# Patient Record
Sex: FEMALE | Race: WHITE | NOT HISPANIC OR LATINO
[De-identification: names, ages, dates, MRNs, and addresses within clinical notes are randomized per-mention and may not be internally consistent; named-entity substitution may affect disease eponyms.]

---

## 2017-02-07 ENCOUNTER — RX RENEWAL (OUTPATIENT)
Age: 57
End: 2017-02-07

## 2017-02-09 ENCOUNTER — OTHER (OUTPATIENT)
Age: 57
End: 2017-02-09

## 2017-02-13 ENCOUNTER — APPOINTMENT (OUTPATIENT)
Dept: INTERNAL MEDICINE | Facility: CLINIC | Age: 57
End: 2017-02-13

## 2017-04-10 ENCOUNTER — APPOINTMENT (OUTPATIENT)
Dept: INTERNAL MEDICINE | Facility: CLINIC | Age: 57
End: 2017-04-10

## 2017-06-22 ENCOUNTER — APPOINTMENT (OUTPATIENT)
Dept: INTERNAL MEDICINE | Facility: CLINIC | Age: 57
End: 2017-06-22

## 2017-06-22 VITALS
OXYGEN SATURATION: 98 % | TEMPERATURE: 98.2 F | SYSTOLIC BLOOD PRESSURE: 150 MMHG | WEIGHT: 169 LBS | BODY MASS INDEX: 24.96 KG/M2 | HEART RATE: 83 BPM | DIASTOLIC BLOOD PRESSURE: 70 MMHG

## 2017-06-22 DIAGNOSIS — E78.5 HYPERLIPIDEMIA, UNSPECIFIED: ICD-10-CM

## 2017-06-22 DIAGNOSIS — L97.511 NON-PRESSURE CHRONIC ULCER OF OTHER PART OF RIGHT FOOT LIMITED TO BREAKDOWN OF SKIN: ICD-10-CM

## 2017-06-22 DIAGNOSIS — M54.9 DORSALGIA, UNSPECIFIED: ICD-10-CM

## 2017-06-22 DIAGNOSIS — S82.209A UNSPECIFIED FRACTURE OF SHAFT OF UNSPECIFIED TIBIA, INITIAL ENCOUNTER FOR CLOSED FRACTURE: ICD-10-CM

## 2017-06-22 DIAGNOSIS — L30.4 ERYTHEMA INTERTRIGO: ICD-10-CM

## 2017-06-23 ENCOUNTER — OTHER (OUTPATIENT)
Age: 57
End: 2017-06-23

## 2017-06-23 LAB
ANION GAP SERPL CALC-SCNC: 19 MMOL/L
BUN SERPL-MCNC: 8 MG/DL
CALCIUM SERPL-MCNC: 9.8 MG/DL
CHLORIDE SERPL-SCNC: 98 MMOL/L
CHOLEST SERPL-MCNC: 201 MG/DL
CHOLEST/HDLC SERPL: 3.9 RATIO
CO2 SERPL-SCNC: 23 MMOL/L
CREAT SERPL-MCNC: 0.72 MG/DL
CREAT SPEC-SCNC: 21 MG/DL
GLUCOSE SERPL-MCNC: 97 MG/DL
HBA1C MFR BLD HPLC: 6.4 %
HDLC SERPL-MCNC: 51 MG/DL
LDLC SERPL CALC-MCNC: 112 MG/DL
MICROALBUMIN 24H UR DL<=1MG/L-MCNC: 0.3 MG/DL
MICROALBUMIN/CREAT 24H UR-RTO: 14 MG/G
POTASSIUM SERPL-SCNC: 4.3 MMOL/L
SODIUM SERPL-SCNC: 140 MMOL/L
TRIGL SERPL-MCNC: 191 MG/DL

## 2017-07-07 ENCOUNTER — RESULT REVIEW (OUTPATIENT)
Age: 57
End: 2017-07-07

## 2017-07-11 ENCOUNTER — RESULT REVIEW (OUTPATIENT)
Age: 57
End: 2017-07-11

## 2017-07-25 ENCOUNTER — APPOINTMENT (OUTPATIENT)
Dept: INTERNAL MEDICINE | Facility: CLINIC | Age: 57
End: 2017-07-25
Payer: MEDICAID

## 2017-07-25 VITALS
DIASTOLIC BLOOD PRESSURE: 76 MMHG | OXYGEN SATURATION: 98 % | TEMPERATURE: 97.9 F | SYSTOLIC BLOOD PRESSURE: 120 MMHG | WEIGHT: 172 LBS | HEIGHT: 69 IN | HEART RATE: 67 BPM | BODY MASS INDEX: 25.48 KG/M2

## 2017-07-25 PROCEDURE — 99213 OFFICE O/P EST LOW 20 MIN: CPT | Mod: 25,GE

## 2017-07-25 PROCEDURE — 36415 COLL VENOUS BLD VENIPUNCTURE: CPT

## 2017-07-26 LAB
24R-OH-CALCIDIOL SERPL-MCNC: 70.9 PG/ML
25(OH)D3 SERPL-MCNC: 23.8 NG/ML

## 2017-08-25 ENCOUNTER — RX RENEWAL (OUTPATIENT)
Age: 57
End: 2017-08-25

## 2017-08-28 ENCOUNTER — RX RENEWAL (OUTPATIENT)
Age: 57
End: 2017-08-28

## 2017-10-17 ENCOUNTER — APPOINTMENT (OUTPATIENT)
Dept: VASCULAR SURGERY | Facility: CLINIC | Age: 57
End: 2017-10-17

## 2017-11-03 ENCOUNTER — APPOINTMENT (OUTPATIENT)
Dept: INTERNAL MEDICINE | Facility: CLINIC | Age: 57
End: 2017-11-03
Payer: COMMERCIAL

## 2017-11-03 VITALS
BODY MASS INDEX: 24.22 KG/M2 | OXYGEN SATURATION: 98 % | DIASTOLIC BLOOD PRESSURE: 58 MMHG | HEART RATE: 62 BPM | TEMPERATURE: 97.9 F | WEIGHT: 164 LBS | SYSTOLIC BLOOD PRESSURE: 110 MMHG

## 2017-11-03 DIAGNOSIS — J30.2 OTHER SEASONAL ALLERGIC RHINITIS: ICD-10-CM

## 2017-11-03 DIAGNOSIS — Z23 ENCOUNTER FOR IMMUNIZATION: ICD-10-CM

## 2017-11-03 PROCEDURE — G0008: CPT

## 2017-11-03 PROCEDURE — 90686 IIV4 VACC NO PRSV 0.5 ML IM: CPT

## 2017-11-03 PROCEDURE — 99213 OFFICE O/P EST LOW 20 MIN: CPT | Mod: 25,GE

## 2017-11-03 PROCEDURE — 36415 COLL VENOUS BLD VENIPUNCTURE: CPT

## 2017-11-03 RX ORDER — LANOLIN ALCOHOL/MO/W.PET/CERES
CREAM (GRAM) TOPICAL
Qty: 1 | Refills: 0 | Status: DISCONTINUED | COMMUNITY
Start: 2017-06-22 | End: 2017-11-03

## 2017-11-03 RX ORDER — ERGOCALCIFEROL 1.25 MG/1
1.25 MG CAPSULE, LIQUID FILLED ORAL
Qty: 30 | Refills: 2 | Status: DISCONTINUED | COMMUNITY
Start: 2017-06-22 | End: 2017-11-03

## 2017-11-03 RX ORDER — NYSTATIN 100000 [USP'U]/G
100000 CREAM TOPICAL TWICE DAILY
Qty: 1 | Refills: 0 | Status: DISCONTINUED | COMMUNITY
Start: 2017-06-22 | End: 2017-11-03

## 2017-11-03 RX ORDER — OSTOMY SUPPLY 2 3/4"
EACH MISCELLANEOUS
Qty: 1 | Refills: 0 | Status: DISCONTINUED | COMMUNITY
Start: 2017-06-22 | End: 2017-11-03

## 2017-11-03 RX ORDER — METHOCARBAMOL 500 MG/1
500 TABLET, FILM COATED ORAL EVERY 6 HOURS
Refills: 0 | Status: DISCONTINUED | COMMUNITY
Start: 2017-06-22 | End: 2017-11-03

## 2017-11-07 LAB
25(OH)D3 SERPL-MCNC: 32.3 NG/ML
HBA1C MFR BLD HPLC: 6.2 %

## 2017-11-24 ENCOUNTER — APPOINTMENT (OUTPATIENT)
Dept: INTERNAL MEDICINE | Facility: CLINIC | Age: 57
End: 2017-11-24

## 2017-11-27 ENCOUNTER — OTHER (OUTPATIENT)
Age: 57
End: 2017-11-27

## 2017-12-01 ENCOUNTER — APPOINTMENT (OUTPATIENT)
Dept: INTERNAL MEDICINE | Facility: CLINIC | Age: 57
End: 2017-12-01
Payer: COMMERCIAL

## 2017-12-01 ENCOUNTER — APPOINTMENT (OUTPATIENT)
Dept: INTERNAL MEDICINE | Facility: CLINIC | Age: 57
End: 2017-12-01

## 2017-12-01 VITALS
DIASTOLIC BLOOD PRESSURE: 75 MMHG | SYSTOLIC BLOOD PRESSURE: 122 MMHG | WEIGHT: 166 LBS | TEMPERATURE: 98.1 F | BODY MASS INDEX: 24.51 KG/M2 | HEART RATE: 78 BPM | OXYGEN SATURATION: 97 %

## 2017-12-01 DIAGNOSIS — Z12.4 ENCOUNTER FOR SCREENING FOR MALIGNANT NEOPLASM OF CERVIX: ICD-10-CM

## 2017-12-01 PROCEDURE — 99214 OFFICE O/P EST MOD 30 MIN: CPT | Mod: GC

## 2017-12-04 ENCOUNTER — OTHER (OUTPATIENT)
Age: 57
End: 2017-12-04

## 2017-12-15 LAB
CYTOLOGY CVX/VAG DOC THIN PREP: NORMAL
HPV HIGH+LOW RISK DNA PNL CVX: NOT DETECTED

## 2017-12-20 ENCOUNTER — FORM ENCOUNTER (OUTPATIENT)
Age: 57
End: 2017-12-20

## 2017-12-21 ENCOUNTER — OUTPATIENT (OUTPATIENT)
Dept: OUTPATIENT SERVICES | Facility: HOSPITAL | Age: 57
LOS: 1 days | End: 2017-12-21
Payer: COMMERCIAL

## 2017-12-21 PROCEDURE — 77067 SCR MAMMO BI INCL CAD: CPT

## 2017-12-21 PROCEDURE — G0202: CPT | Mod: 26

## 2018-04-02 ENCOUNTER — APPOINTMENT (OUTPATIENT)
Dept: INTERNAL MEDICINE | Facility: CLINIC | Age: 58
End: 2018-04-02

## 2018-04-03 ENCOUNTER — APPOINTMENT (OUTPATIENT)
Dept: VASCULAR SURGERY | Facility: CLINIC | Age: 58
End: 2018-04-03

## 2018-05-29 ENCOUNTER — RX RENEWAL (OUTPATIENT)
Age: 58
End: 2018-05-29

## 2018-06-17 ENCOUNTER — RESULT CHARGE (OUTPATIENT)
Age: 58
End: 2018-06-17

## 2018-06-18 ENCOUNTER — APPOINTMENT (OUTPATIENT)
Dept: INTERNAL MEDICINE | Facility: CLINIC | Age: 58
End: 2018-06-18
Payer: COMMERCIAL

## 2018-06-18 VITALS
WEIGHT: 176 LBS | DIASTOLIC BLOOD PRESSURE: 58 MMHG | TEMPERATURE: 98.2 F | OXYGEN SATURATION: 98 % | HEIGHT: 69 IN | BODY MASS INDEX: 26.07 KG/M2 | SYSTOLIC BLOOD PRESSURE: 113 MMHG | HEART RATE: 68 BPM

## 2018-06-18 DIAGNOSIS — Z13.820 ENCOUNTER FOR SCREENING FOR OSTEOPOROSIS: ICD-10-CM

## 2018-06-18 DIAGNOSIS — Z11.3 ENCOUNTER FOR SCREENING FOR INFECTIONS WITH A PREDOMINANTLY SEXUAL MODE OF TRANSMISSION: ICD-10-CM

## 2018-06-18 DIAGNOSIS — E55.9 VITAMIN D DEFICIENCY, UNSPECIFIED: ICD-10-CM

## 2018-06-18 DIAGNOSIS — S82.209A UNSPECIFIED FRACTURE OF SHAFT OF UNSPECIFIED TIBIA, INITIAL ENCOUNTER FOR CLOSED FRACTURE: ICD-10-CM

## 2018-06-18 PROCEDURE — 93000 ELECTROCARDIOGRAM COMPLETE: CPT

## 2018-06-18 PROCEDURE — 90715 TDAP VACCINE 7 YRS/> IM: CPT

## 2018-06-18 PROCEDURE — 99396 PREV VISIT EST AGE 40-64: CPT | Mod: 25,GE

## 2018-06-18 PROCEDURE — 36415 COLL VENOUS BLD VENIPUNCTURE: CPT

## 2018-06-18 PROCEDURE — 90471 IMMUNIZATION ADMIN: CPT

## 2018-06-18 RX ORDER — CROMOLYN SODIUM 40 MG/ML
4 SOLUTION/ DROPS OPHTHALMIC 4 TIMES DAILY
Refills: 0 | Status: DISCONTINUED | COMMUNITY
Start: 2017-11-03 | End: 2018-06-18

## 2018-06-19 LAB
ANION GAP SERPL CALC-SCNC: 17 MMOL/L
BUN SERPL-MCNC: 12 MG/DL
CALCIUM SERPL-MCNC: 10 MG/DL
CHLORIDE SERPL-SCNC: 103 MMOL/L
CHOLEST SERPL-MCNC: 169 MG/DL
CHOLEST/HDLC SERPL: 4.3 RATIO
CO2 SERPL-SCNC: 23 MMOL/L
CREAT SERPL-MCNC: 0.88 MG/DL
CREAT SPEC-SCNC: 12 MG/DL
GLUCOSE SERPL-MCNC: 84 MG/DL
HBA1C MFR BLD HPLC: 6.6 %
HDLC SERPL-MCNC: 39 MG/DL
LDLC SERPL CALC-MCNC: 89 MG/DL
MICROALBUMIN 24H UR DL<=1MG/L-MCNC: 0.7 MG/DL
MICROALBUMIN/CREAT 24H UR-RTO: 58 MG/G
POTASSIUM SERPL-SCNC: 4.6 MMOL/L
SODIUM SERPL-SCNC: 143 MMOL/L
TRIGL SERPL-MCNC: 204 MG/DL

## 2018-06-25 ENCOUNTER — APPOINTMENT (OUTPATIENT)
Dept: INTERNAL MEDICINE | Facility: CLINIC | Age: 58
End: 2018-06-25

## 2018-07-03 ENCOUNTER — APPOINTMENT (OUTPATIENT)
Dept: CT IMAGING | Facility: HOSPITAL | Age: 58
End: 2018-07-03

## 2018-07-03 ENCOUNTER — APPOINTMENT (OUTPATIENT)
Dept: PULMONOLOGY | Facility: CLINIC | Age: 58
End: 2018-07-03

## 2018-07-27 PROBLEM — J30.2 SEASONAL ALLERGIES: Status: ACTIVE | Noted: 2017-11-03

## 2018-08-03 ENCOUNTER — RX RENEWAL (OUTPATIENT)
Age: 58
End: 2018-08-03

## 2018-09-12 ENCOUNTER — FORM ENCOUNTER (OUTPATIENT)
Age: 58
End: 2018-09-12

## 2018-09-13 ENCOUNTER — APPOINTMENT (OUTPATIENT)
Dept: RADIOLOGY | Facility: HOSPITAL | Age: 58
End: 2018-09-13
Payer: COMMERCIAL

## 2018-09-13 ENCOUNTER — OUTPATIENT (OUTPATIENT)
Dept: OUTPATIENT SERVICES | Facility: HOSPITAL | Age: 58
LOS: 1 days | End: 2018-09-13
Payer: COMMERCIAL

## 2018-09-13 PROCEDURE — 77080 DXA BONE DENSITY AXIAL: CPT | Mod: 26

## 2018-09-13 PROCEDURE — 77080 DXA BONE DENSITY AXIAL: CPT

## 2018-09-18 ENCOUNTER — MEDICATION RENEWAL (OUTPATIENT)
Age: 58
End: 2018-09-18

## 2018-09-18 ENCOUNTER — APPOINTMENT (OUTPATIENT)
Dept: INTERNAL MEDICINE | Facility: CLINIC | Age: 58
End: 2018-09-18
Payer: COMMERCIAL

## 2018-09-18 VITALS
SYSTOLIC BLOOD PRESSURE: 120 MMHG | HEART RATE: 78 BPM | TEMPERATURE: 98.4 F | HEIGHT: 69 IN | DIASTOLIC BLOOD PRESSURE: 76 MMHG | BODY MASS INDEX: 27.11 KG/M2 | OXYGEN SATURATION: 93 % | WEIGHT: 183 LBS

## 2018-09-18 DIAGNOSIS — M85.852 OTHER SPECIFIED DISORDERS OF BONE DENSITY AND STRUCTURE, LEFT THIGH: ICD-10-CM

## 2018-09-18 DIAGNOSIS — Z00.00 ENCOUNTER FOR GENERAL ADULT MEDICAL EXAMINATION W/OUT ABNORMAL FINDINGS: ICD-10-CM

## 2018-09-18 DIAGNOSIS — Z12.2 ENCOUNTER FOR SCREENING FOR MALIGNANT NEOPLASM OF RESPIRATORY ORGANS: ICD-10-CM

## 2018-09-18 PROBLEM — Z11.3 SCREENING FOR STD (SEXUALLY TRANSMITTED DISEASE): Status: ACTIVE | Noted: 2018-09-18

## 2018-09-18 PROCEDURE — 36415 COLL VENOUS BLD VENIPUNCTURE: CPT

## 2018-09-18 PROCEDURE — 99214 OFFICE O/P EST MOD 30 MIN: CPT | Mod: 25,GC

## 2018-09-19 LAB
ANION GAP SERPL CALC-SCNC: 14 MMOL/L
BUN SERPL-MCNC: 15 MG/DL
CALCIUM SERPL-MCNC: 10.1 MG/DL
CHLORIDE SERPL-SCNC: 105 MMOL/L
CO2 SERPL-SCNC: 26 MMOL/L
CREAT SERPL-MCNC: 1.01 MG/DL
GLUCOSE SERPL-MCNC: 106 MG/DL
HBA1C MFR BLD HPLC: 6.5 %
POTASSIUM SERPL-SCNC: 5.2 MMOL/L
SODIUM SERPL-SCNC: 145 MMOL/L

## 2018-10-10 ENCOUNTER — MED ADMIN CHARGE (OUTPATIENT)
Age: 58
End: 2018-10-10

## 2018-10-10 ENCOUNTER — APPOINTMENT (OUTPATIENT)
Dept: INTERNAL MEDICINE | Facility: CLINIC | Age: 58
End: 2018-10-10
Payer: COMMERCIAL

## 2018-10-10 ENCOUNTER — RESULT CHARGE (OUTPATIENT)
Age: 58
End: 2018-10-10

## 2018-10-10 VITALS
DIASTOLIC BLOOD PRESSURE: 78 MMHG | HEART RATE: 83 BPM | SYSTOLIC BLOOD PRESSURE: 134 MMHG | HEIGHT: 69 IN | RESPIRATION RATE: 16 BRPM | OXYGEN SATURATION: 98 % | BODY MASS INDEX: 26.66 KG/M2 | WEIGHT: 180 LBS | TEMPERATURE: 98 F

## 2018-10-10 DIAGNOSIS — Z23 ENCOUNTER FOR IMMUNIZATION: ICD-10-CM

## 2018-10-10 DIAGNOSIS — Z87.09 PERSONAL HISTORY OF OTHER DISEASES OF THE RESPIRATORY SYSTEM: ICD-10-CM

## 2018-10-10 DIAGNOSIS — J35.8 OTHER CHRONIC DISEASES OF TONSILS AND ADENOIDS: ICD-10-CM

## 2018-10-10 LAB — S PYO AG SPEC QL IA: NEGATIVE

## 2018-10-10 PROCEDURE — 87880 STREP A ASSAY W/OPTIC: CPT | Mod: QW

## 2018-10-10 PROCEDURE — 90686 IIV4 VACC NO PRSV 0.5 ML IM: CPT

## 2018-10-10 PROCEDURE — G0008: CPT

## 2018-10-10 PROCEDURE — 99213 OFFICE O/P EST LOW 20 MIN: CPT | Mod: GC,25

## 2018-10-10 RX ORDER — GUAIFENESIN AND PSEUDOEPHEDRINE HYDROCHLORIDE 600; 60 MG/1; MG/1
60-600 TABLET, EXTENDED RELEASE ORAL
Qty: 1 | Refills: 0 | Status: ACTIVE | COMMUNITY
Start: 2018-10-10 | End: 1900-01-01

## 2018-10-22 ENCOUNTER — FORM ENCOUNTER (OUTPATIENT)
Age: 58
End: 2018-10-22

## 2018-10-23 ENCOUNTER — OUTPATIENT (OUTPATIENT)
Dept: OUTPATIENT SERVICES | Facility: HOSPITAL | Age: 58
LOS: 1 days | End: 2018-10-23
Payer: COMMERCIAL

## 2018-10-23 ENCOUNTER — APPOINTMENT (OUTPATIENT)
Dept: PULMONOLOGY | Facility: CLINIC | Age: 58
End: 2018-10-23
Payer: COMMERCIAL

## 2018-10-23 ENCOUNTER — APPOINTMENT (OUTPATIENT)
Dept: CT IMAGING | Facility: HOSPITAL | Age: 58
End: 2018-10-23
Payer: COMMERCIAL

## 2018-10-23 VITALS
DIASTOLIC BLOOD PRESSURE: 80 MMHG | HEART RATE: 92 BPM | BODY MASS INDEX: 26.66 KG/M2 | WEIGHT: 180 LBS | SYSTOLIC BLOOD PRESSURE: 140 MMHG | TEMPERATURE: 98.3 F | OXYGEN SATURATION: 97 % | RESPIRATION RATE: 12 BRPM | HEIGHT: 69 IN

## 2018-10-23 DIAGNOSIS — F17.210 NICOTINE DEPENDENCE, CIGARETTES, UNCOMPLICATED: ICD-10-CM

## 2018-10-23 PROCEDURE — G0297: CPT

## 2018-10-23 PROCEDURE — 99406 BEHAV CHNG SMOKING 3-10 MIN: CPT

## 2018-10-23 PROCEDURE — G0296 VISIT TO DETERM LDCT ELIG: CPT

## 2018-10-23 PROCEDURE — G0297: CPT | Mod: 26

## 2018-10-29 ENCOUNTER — RESULT REVIEW (OUTPATIENT)
Age: 58
End: 2018-10-29

## 2018-11-08 ENCOUNTER — OTHER (OUTPATIENT)
Age: 58
End: 2018-11-08

## 2018-11-13 ENCOUNTER — OTHER (OUTPATIENT)
Age: 58
End: 2018-11-13

## 2018-11-26 ENCOUNTER — APPOINTMENT (OUTPATIENT)
Dept: INTERNAL MEDICINE | Facility: CLINIC | Age: 58
End: 2018-11-26

## 2018-11-27 ENCOUNTER — APPOINTMENT (OUTPATIENT)
Dept: VASCULAR SURGERY | Facility: CLINIC | Age: 58
End: 2018-11-27
Payer: COMMERCIAL

## 2018-11-27 PROCEDURE — 93978 VASCULAR STUDY: CPT

## 2018-11-27 PROCEDURE — 99213 OFFICE O/P EST LOW 20 MIN: CPT | Mod: 25

## 2018-12-10 ENCOUNTER — APPOINTMENT (OUTPATIENT)
Dept: INTERNAL MEDICINE | Facility: CLINIC | Age: 58
End: 2018-12-10
Payer: COMMERCIAL

## 2018-12-10 VITALS
HEIGHT: 69 IN | HEART RATE: 93 BPM | OXYGEN SATURATION: 98 % | WEIGHT: 179 LBS | TEMPERATURE: 98.3 F | DIASTOLIC BLOOD PRESSURE: 84 MMHG | BODY MASS INDEX: 26.51 KG/M2 | SYSTOLIC BLOOD PRESSURE: 170 MMHG

## 2018-12-10 VITALS — SYSTOLIC BLOOD PRESSURE: 140 MMHG | DIASTOLIC BLOOD PRESSURE: 70 MMHG

## 2018-12-10 DIAGNOSIS — Z23 ENCOUNTER FOR IMMUNIZATION: ICD-10-CM

## 2018-12-10 PROCEDURE — 36415 COLL VENOUS BLD VENIPUNCTURE: CPT

## 2018-12-10 PROCEDURE — 99214 OFFICE O/P EST MOD 30 MIN: CPT | Mod: GC,25

## 2018-12-10 RX ORDER — ZOSTER VACCINE RECOMBINANT, ADJUVANTED 50 MCG/0.5
50 KIT INTRAMUSCULAR
Qty: 1 | Refills: 0 | Status: ACTIVE | COMMUNITY
Start: 2018-12-10 | End: 1900-01-01

## 2018-12-11 LAB
ANION GAP SERPL CALC-SCNC: 16 MMOL/L
BUN SERPL-MCNC: 6 MG/DL
CALCIUM SERPL-MCNC: 10 MG/DL
CALCIUM SERPL-MCNC: 10.2 MG/DL
CHLORIDE SERPL-SCNC: 104 MMOL/L
CO2 SERPL-SCNC: 22 MMOL/L
CREAT SERPL-MCNC: 0.86 MG/DL
GLUCOSE SERPL-MCNC: 98 MG/DL
HBA1C MFR BLD HPLC: 6.2 %
PARATHYROID HORMONE INTACT: 99 PG/ML
POTASSIUM SERPL-SCNC: 4.5 MMOL/L
SODIUM SERPL-SCNC: 142 MMOL/L
TSH SERPL-ACNC: 3.62 UIU/ML

## 2019-01-09 ENCOUNTER — RX RENEWAL (OUTPATIENT)
Age: 59
End: 2019-01-09

## 2019-06-27 ENCOUNTER — APPOINTMENT (OUTPATIENT)
Dept: INTERNAL MEDICINE | Facility: CLINIC | Age: 59
End: 2019-06-27
Payer: COMMERCIAL

## 2019-06-27 VITALS
RESPIRATION RATE: 12 BRPM | HEART RATE: 88 BPM | OXYGEN SATURATION: 95 % | DIASTOLIC BLOOD PRESSURE: 81 MMHG | TEMPERATURE: 97.3 F | HEIGHT: 69 IN | WEIGHT: 192 LBS | SYSTOLIC BLOOD PRESSURE: 151 MMHG | BODY MASS INDEX: 28.44 KG/M2

## 2019-06-27 VITALS — SYSTOLIC BLOOD PRESSURE: 142 MMHG | DIASTOLIC BLOOD PRESSURE: 82 MMHG

## 2019-06-27 DIAGNOSIS — Z78.9 OTHER SPECIFIED HEALTH STATUS: ICD-10-CM

## 2019-06-27 DIAGNOSIS — E11.9 TYPE 2 DIABETES MELLITUS W/OUT COMPLICATIONS: ICD-10-CM

## 2019-06-27 DIAGNOSIS — L30.9 DERMATITIS, UNSPECIFIED: ICD-10-CM

## 2019-06-27 PROCEDURE — 36415 COLL VENOUS BLD VENIPUNCTURE: CPT

## 2019-06-27 PROCEDURE — 99214 OFFICE O/P EST MOD 30 MIN: CPT | Mod: 25,GC

## 2019-06-27 RX ORDER — MELATONIN 5 MG
25 MCG TABLET,DISINTEGRATING ORAL
Qty: 90 | Refills: 3 | Status: ACTIVE | COMMUNITY
Start: 2017-07-26 | End: 1900-01-01

## 2019-07-01 LAB
ESTIMATED AVERAGE GLUCOSE: 131 MG/DL
HBA1C MFR BLD HPLC: 6.2 %
MEV IGG FLD QL IA: <5 AU/ML
MEV IGG+IGM SER-IMP: NEGATIVE
MUV AB SER-ACNC: POSITIVE
MUV IGG SER QL IA: 18.5 AU/ML
RUBV IGG FLD-ACNC: <0.1 INDEX
RUBV IGG SER-IMP: NEGATIVE

## 2019-10-17 ENCOUNTER — APPOINTMENT (OUTPATIENT)
Dept: INTERNAL MEDICINE | Facility: CLINIC | Age: 59
End: 2019-10-17
Payer: COMMERCIAL

## 2019-10-17 ENCOUNTER — MED ADMIN CHARGE (OUTPATIENT)
Age: 59
End: 2019-10-17

## 2019-10-17 VITALS
BODY MASS INDEX: 27.4 KG/M2 | HEART RATE: 94 BPM | DIASTOLIC BLOOD PRESSURE: 78 MMHG | OXYGEN SATURATION: 95 % | SYSTOLIC BLOOD PRESSURE: 149 MMHG | TEMPERATURE: 97.6 F | HEIGHT: 69 IN | WEIGHT: 185 LBS

## 2019-10-17 DIAGNOSIS — Z12.39 ENCOUNTER FOR OTHER SCREENING FOR MALIGNANT NEOPLASM OF BREAST: ICD-10-CM

## 2019-10-17 DIAGNOSIS — I10 ESSENTIAL (PRIMARY) HYPERTENSION: ICD-10-CM

## 2019-10-17 PROCEDURE — G0008: CPT

## 2019-10-17 PROCEDURE — 90686 IIV4 VACC NO PRSV 0.5 ML IM: CPT

## 2019-10-17 PROCEDURE — 36415 COLL VENOUS BLD VENIPUNCTURE: CPT

## 2019-10-17 PROCEDURE — 99214 OFFICE O/P EST MOD 30 MIN: CPT | Mod: GC,25

## 2019-10-17 RX ORDER — CLOBETASOL PROPIONATE 0.5 MG/G
0.05 CREAM TOPICAL TWICE DAILY
Qty: 1 | Refills: 2 | Status: ACTIVE | COMMUNITY
Start: 2018-06-18 | End: 1900-01-01

## 2019-10-17 RX ORDER — ATORVASTATIN CALCIUM 40 MG/1
40 TABLET, FILM COATED ORAL DAILY
Qty: 90 | Refills: 2 | Status: ACTIVE | COMMUNITY
Start: 2017-06-22 | End: 1900-01-01

## 2019-10-17 RX ORDER — LOSARTAN POTASSIUM 100 MG/1
100 TABLET, FILM COATED ORAL
Qty: 90 | Refills: 2 | Status: ACTIVE | COMMUNITY
Start: 2018-12-10 | End: 1900-01-01

## 2019-10-21 LAB
ANION GAP SERPL CALC-SCNC: 19 MMOL/L
BUN SERPL-MCNC: 6 MG/DL
CALCIUM SERPL-MCNC: 9.9 MG/DL
CHLORIDE SERPL-SCNC: 99 MMOL/L
CHOLEST SERPL-MCNC: 346 MG/DL
CHOLEST/HDLC SERPL: 5.6 RATIO
CO2 SERPL-SCNC: 20 MMOL/L
CREAT SERPL-MCNC: 0.76 MG/DL
CREAT SPEC-SCNC: 16 MG/DL
ESTIMATED AVERAGE GLUCOSE: 128 MG/DL
GLUCOSE SERPL-MCNC: 107 MG/DL
HBA1C MFR BLD HPLC: 6.1 %
HDLC SERPL-MCNC: 62 MG/DL
LDLC SERPL CALC-MCNC: 242 MG/DL
MICROALBUMIN 24H UR DL<=1MG/L-MCNC: <1.2 MG/DL
MICROALBUMIN/CREAT 24H UR-RTO: NORMAL MG/G
POTASSIUM SERPL-SCNC: 4.4 MMOL/L
SODIUM SERPL-SCNC: 138 MMOL/L
TRIGL SERPL-MCNC: 212 MG/DL

## 2019-11-05 ENCOUNTER — APPOINTMENT (OUTPATIENT)
Dept: PULMONOLOGY | Facility: CLINIC | Age: 59
End: 2019-11-05

## 2019-11-14 ENCOUNTER — FORM ENCOUNTER (OUTPATIENT)
Age: 59
End: 2019-11-14

## 2019-11-15 ENCOUNTER — APPOINTMENT (OUTPATIENT)
Dept: PULMONOLOGY | Facility: CLINIC | Age: 59
End: 2019-11-15
Payer: COMMERCIAL

## 2019-11-15 ENCOUNTER — APPOINTMENT (OUTPATIENT)
Dept: CT IMAGING | Facility: HOSPITAL | Age: 59
End: 2019-11-15
Payer: COMMERCIAL

## 2019-11-15 ENCOUNTER — OUTPATIENT (OUTPATIENT)
Dept: OUTPATIENT SERVICES | Facility: HOSPITAL | Age: 59
LOS: 1 days | End: 2019-11-15
Payer: COMMERCIAL

## 2019-11-15 VITALS
HEIGHT: 69 IN | OXYGEN SATURATION: 96 % | SYSTOLIC BLOOD PRESSURE: 140 MMHG | BODY MASS INDEX: 27.85 KG/M2 | TEMPERATURE: 98.1 F | DIASTOLIC BLOOD PRESSURE: 90 MMHG | WEIGHT: 188 LBS | HEART RATE: 89 BPM

## 2019-11-15 DIAGNOSIS — Z87.891 PERSONAL HISTORY OF NICOTINE DEPENDENCE: ICD-10-CM

## 2019-11-15 PROCEDURE — G0297: CPT | Mod: 26

## 2019-11-15 PROCEDURE — G0296 VISIT TO DETERM LDCT ELIG: CPT

## 2019-11-15 PROCEDURE — G0297: CPT

## 2019-11-15 PROCEDURE — 99406 BEHAV CHNG SMOKING 3-10 MIN: CPT | Mod: 25

## 2019-11-15 NOTE — PLAN
[Smoking Cessation Pamphlet Given] : smoking cessation pamphlet given to patient  [Smoking Cessation Guidance Provided] : Smoking cessation guidance was provided to patient [FreeTextEntry1] : Plan:\par -Low Dose CT chest for lung cancer screening\par -Follow up with patient and her referring provider after her LDCT results have been reviewed by the multi-disciplinary clinical team\par -Encouraged continued smoking abstinence\par -[facility] Smokers Quitline literature shared with patient and Staying Smoke Free brochure\par \par \par Engaged in shared decision making with Ms. ARIESPATRICIA PHILIP . Answered all questions. She verbalized understanding and agreement. She knows to call back with any questions or concerns

## 2019-11-15 NOTE — REASON FOR VISIT
[Review of Eligibility] : review of eligibility [Annual Follow-Up] : an annual follow-up visit [Face-to-Face Visit] : face-to-face visit

## 2019-11-15 NOTE — HISTORY OF PRESENT ILLNESS
[Former] : former smoker [>= 30 pack years] : >= 30 pack years [TextBox_13] : Referred by Dr. Hu\par \par Ms. ARIES PHILIP is a 59 year old woman with a history of HTN, high cholesterol and DM\par \par She was seen in the office today for review of eligibility for, as well as, discussion of Low-Dose CT lung cancer screening program. Reviewed and confirmed that the patient meets screening eligibility criteria:\par -Age: 59 year \par Smoking status:\par -Current smoker of 2 cigarettes\par -Number of pack years smokin\par -Number of pack(s) per day: 1.5\par -Number of year smoked: 31\par Quit 2 weeks ago. Not using any NRT. Not interested in using any at this time Gained 40 pounds when quit in the past so is concerned about weight gain\par \par Ms. PHILIP denies any signs or symptoms of lung cancer including new cough, change in cough, hemoptysis and unintentional weight loss.\par \par Ms. PHILIP denies any personal history of lung cancer. No lung cancer in a 1st degree relative. Denies any history of lung disease. Denies any history of occupational exposures. [TextBox_6] : 1.5 [TextBox_8] : 31 [TextBox_10] : 2019

## 2019-11-15 NOTE — ASSESSMENT
[Discussed Risks and Advised to Quit Smoking] : Discussed risks and advised to quit smoking [Smoking Cessation Counseling Given (more than 3 min less than10 min) and Resources Provided] : Smoking cessation counseling given (more than 3 min less than 10 min) and resources provided [Action] : Action: patient is a former smoker who stopped within the past 6 months [de-identified] : Acknowledged how difficult it is to quit smoking. Advised that quitting smoking is the most important thing a person can do for their health. Discussed strategies to deal with cravings. Suggested use of daily nicotine patch and use of gum prn for cravings  Instructed in proper use of gum

## 2019-11-26 ENCOUNTER — APPOINTMENT (OUTPATIENT)
Dept: VASCULAR SURGERY | Facility: CLINIC | Age: 59
End: 2019-11-26
Payer: COMMERCIAL

## 2019-11-26 PROCEDURE — 93926 LOWER EXTREMITY STUDY: CPT

## 2019-11-26 PROCEDURE — 99213 OFFICE O/P EST LOW 20 MIN: CPT

## 2019-12-02 NOTE — HISTORY OF PRESENT ILLNESS
[FreeTextEntry1] : 55yoF s/p R BRENDON/EIA PTA/stent, CFA PTA for symptoms of severe claudication after occlusion of her R fem-pop bypass.  Currently, pt reports good exercise tolerance, able to walk 5 blocks before claudicating in the R calf.  She denies any rest pain in the legs, and has been compliant w/all meds, though she still reports smoking 1ppw.

## 2019-12-02 NOTE — ASSESSMENT
[FreeTextEntry1] : Pt w/stable claudication after occlusion of her R fem-pop bypass and subsequent BRENDON/EIA PTA/stent, and CFA PTA.  Pt compliant w/all meds, but still smoking 1ppw.  Daily ambulation and exercise encouraged.  Recommend pt continue medical management and exercise and return in 1y for surveillance duplex of R BRENDON stent and limit smoking w/the goal of stopping completely..\par \par I personally discussed this patient with the Physician Assistant at the time of the visit. I agree with the assessment and plan as written

## 2019-12-02 NOTE — PHYSICAL EXAM
[Normal Thyroid] : the thyroid was normal [Normal Breath Sounds] : Normal breath sounds [Normal Heart Sounds] : normal heart sounds [Normal Rate and Rhythm] : normal rate and rhythm [2+] : right 2+ [1+] : left 1+ [No HSM] : no hepatosplenomegaly [No Rash or Lesion] : No rash or lesion [Alert] : alert [Calm] : calm [JVD] : no jugular venous distention  [Carotid Bruits] : no carotid bruits [Right Carotid Bruit] : no bruit heard over the right carotid [Left Carotid Bruit] : no bruit heard over the left carotid [Ankle Swelling (On Exam)] : not present [Varicose Veins Of Lower Extremities] : not present [] : not present [Abdomen Masses] : No abdominal masses [Abdomen Tenderness] : ~T ~M No abdominal tenderness [Purpura] : no purpura  [Petechiae] : no petechiae [Skin Ulcer] : no ulcer [Skin Induration] : no induration [de-identified] : Well-nourished, NAD, smells of cigarette smoke [de-identified] : NC/AT, anicteric [de-identified] : SNTND, no enhanced pulsatility, large pannus [de-identified] : No hernias noted [de-identified] : FROM throughout, strength 5/5x4, tender fullness at R pretibia at site of previous tibial anastomosis-fullness accentuates w/dorsiflexion of the R foot [de-identified] : No cyanosis/clubbing/pallor/ulcers in toes

## 2020-06-04 ENCOUNTER — APPOINTMENT (OUTPATIENT)
Dept: FAMILY MEDICINE | Facility: CLINIC | Age: 60
End: 2020-06-04

## 2020-07-02 PROBLEM — L97.511 SKIN ULCER OF RIGHT FOOT, LIMITED TO BREAKDOWN OF SKIN: Status: ACTIVE | Noted: 2017-06-22

## 2020-12-15 ENCOUNTER — APPOINTMENT (OUTPATIENT)
Dept: VASCULAR SURGERY | Facility: CLINIC | Age: 60
End: 2020-12-15
Payer: COMMERCIAL

## 2020-12-15 DIAGNOSIS — M79.606 PAIN IN LEG, UNSPECIFIED: ICD-10-CM

## 2020-12-15 PROCEDURE — 99072 ADDL SUPL MATRL&STAF TM PHE: CPT

## 2020-12-15 PROCEDURE — 99213 OFFICE O/P EST LOW 20 MIN: CPT

## 2020-12-15 PROCEDURE — 93926 LOWER EXTREMITY STUDY: CPT

## 2020-12-16 PROBLEM — Z87.09 HISTORY OF ACUTE SINUSITIS: Status: RESOLVED | Noted: 2018-10-10 | Resolved: 2020-12-16

## 2020-12-21 NOTE — HISTORY OF PRESENT ILLNESS
[FreeTextEntry1] : 55yoF s/p R BRENDON/EIA PTA/stent, CFA PTA for symptoms of severe claudication after occlusion of her R fem-pop bypass.  Currently, pt reports good exercise tolerance, able to walk 7-8 blocks before claudicating in the R calf.  She denies any rest pain in the legs, and has been compliant w/all meds, and has not smoked since March 2020.

## 2020-12-21 NOTE — ASSESSMENT
[FreeTextEntry1] : 55yoF s/p R BRENDON/EIA PTA/stent, CFA PTA for symptoms of severe claudication after occlusion of her R fem-pop bypass.  Currently, pt reports good exercise tolerance, able to walk 7-8 blocks before claudicating in the R calf.  She denies any rest pain in the legs, and has been compliant w/all meds, and has not smoked since March 2020.\par \par Pt's claudication improved to 7-8 blocks, and she reports no smoking in the past 9mos.  She will need a new podiatrist, and will f/u in office in 6mos for surveillance of the R BRENDON/EIA PTA/stent.  Exercise encouraged, and pt instructions to continue all meds.\par \par I personally discussed this patient with the Physician Assistant at the time of the visit. I agree with the assessment and plan as written

## 2021-01-14 ENCOUNTER — NON-APPOINTMENT (OUTPATIENT)
Age: 61
End: 2021-01-14

## 2021-06-07 NOTE — PHYSICAL EXAM
[JVD] : no jugular venous distention  [Carotid Bruits] : no carotid bruits [Right Carotid Bruit] : no bruit heard over the right carotid [Left Carotid Bruit] : no bruit heard over the left carotid [Ankle Swelling (On Exam)] : not present [Varicose Veins Of Lower Extremities] : not present [] : not present [Abdomen Masses] : No abdominal masses [Abdomen Tenderness] : ~T ~M No abdominal tenderness [Purpura] : no purpura  [Petechiae] : no petechiae [Skin Ulcer] : no ulcer [Skin Induration] : no induration [de-identified] : Well-nourished, NAD, smells of cigarette smoke [de-identified] : NC/AT, anicteric [de-identified] : SNTND, no enhanced pulsatility, large pannus [de-identified] : No hernias noted [de-identified] : FROM throughout, strength 5/5x4, tender fullness at R pretibia at site of previous tibial anastomosis-fullness accentuates w/dorsiflexion of the R foot [de-identified] : No cyanosis/clubbing/pallor/ulcers in toes [Normal Thyroid] : the thyroid was normal [Normal Breath Sounds] : Normal breath sounds [Normal Heart Sounds] : normal heart sounds [Normal Rate and Rhythm] : normal rate and rhythm [2+] : left 2+ [1+] : left 1+ [No HSM] : no hepatosplenomegaly [No Rash or Lesion] : No rash or lesion [Alert] : alert [Calm] : calm

## 2021-06-15 ENCOUNTER — APPOINTMENT (OUTPATIENT)
Dept: VASCULAR SURGERY | Facility: CLINIC | Age: 61
End: 2021-06-15
Payer: COMMERCIAL

## 2021-06-15 DIAGNOSIS — I70.219 ATHEROSCLEROSIS OF NATIVE ARTERIES OF EXTREMITIES WITH INTERMITTENT CLAUDICATION, UNSPECIFIED EXTREMITY: ICD-10-CM

## 2021-06-15 PROCEDURE — 99072 ADDL SUPL MATRL&STAF TM PHE: CPT

## 2021-06-15 PROCEDURE — 99213 OFFICE O/P EST LOW 20 MIN: CPT

## 2021-06-15 PROCEDURE — 93926 LOWER EXTREMITY STUDY: CPT

## 2021-06-17 NOTE — HISTORY OF PRESENT ILLNESS
[FreeTextEntry1] : 61yoF s/p R BRENDON/EIA PTA/stent, CFA PTA for symptoms of severe claudication after occlusion of her R fem-pop bypass.  Currently, pt reports good exercise tolerance, able to walk 7-8 blocks before claudicating in the R calf.  She denies any rest pain in the legs, and has been compliant w/all meds, and has not smoked since March 2020.

## 2021-06-17 NOTE — ADDENDUM
[FreeTextEntry1] : This note was written by Mike Esteban, acting as a scribe for Dr. Dora Costa.  I, Dr. Dora Costa, have read and attest that all the information, medical decision-making, and discharge instructions within are true and accurate.\par \par I, Dr. Dora Costa, personally performed the evaluation and management (E/M) services for this established patient who presents today with (an) existing condition(s).  That E/M includes conducting the examination, assessing all conditions, and (re)establishing/reinforcing a plan of care.  Today, my ACP, Mike Esteban, was here to observe my evaluation and management services for this condition to be followed going forward.

## 2021-06-17 NOTE — ASSESSMENT
[FreeTextEntry1] : 61yoF s/p R BRENDON/EIA PTA/stent, CFA PTA for symptoms of severe claudication after occlusion of her R fem-pop bypass.  Currently, pt reports good exercise tolerance, able to walk 7-8 blocks before claudicating in the R calf.  She denies any rest pain in the legs, and has been compliant w/all meds, and has not smoked since March 2020.\par \par Pt's claudication improved to 7-8 blocks, and she reports no smoking in the past 9mos.  Limb well-perfused on exam, and arterial duplex to assess for stent patency demonstrates widely patent R BRENDON stent and chronically occluded R fem-pop bypass.  She will f/u in office in 6mos for surveillance of the R BRENDON/EIA PTA/stent.  Exercise encouraged, and pt instructions to continue all meds.\par \par I, Dr. Costa, personally performed the evaluation and management (E/M) services for this established patient who presents today with (a) new problem(s)/exacerbation of (an) existing condition(s).  That E/M includes conducting the examination, assessing all new/exacerbated conditions, and establishing a new plan of care.  Today, ACP, Mike Ruggiero, was here to observe my evaluation and management services for this new problem/exacerbated condition to be followed going forward.

## 2021-06-17 NOTE — PHYSICAL EXAM
[JVD] : no jugular venous distention  [Carotid Bruits] : no carotid bruits [Right Carotid Bruit] : no bruit heard over the right carotid [Left Carotid Bruit] : no bruit heard over the left carotid [Ankle Swelling (On Exam)] : not present [Varicose Veins Of Lower Extremities] : not present [] : not present [Abdomen Masses] : No abdominal masses [Abdomen Tenderness] : ~T ~M No abdominal tenderness [Purpura] : no purpura  [Petechiae] : no petechiae [Skin Ulcer] : no ulcer [Skin Induration] : no induration [de-identified] : Well-nourished, NAD, smells of cigarette smoke [de-identified] : NC/AT, anicteric [de-identified] : SNTND, no enhanced pulsatility, large pannus [de-identified] : No hernias noted [de-identified] : No cyanosis/clubbing/pallor/ulcers in toes [de-identified] : FROM throughout, strength 5/5x4, tender fullness at R pretibia at site of previous tibial anastomosis-fullness accentuates w/dorsiflexion of the R foot

## 2021-06-17 NOTE — PROCEDURE
[FreeTextEntry1] : Arterial duplex to assess for stent patency demonstrates widely patent R BRENDON stent and chronically occluded R fem-pop bypass

## 2023-04-20 VITALS
SYSTOLIC BLOOD PRESSURE: 160 MMHG | OXYGEN SATURATION: 98 % | TEMPERATURE: 98 F | RESPIRATION RATE: 18 BRPM | WEIGHT: 164.91 LBS | HEART RATE: 110 BPM | DIASTOLIC BLOOD PRESSURE: 93 MMHG

## 2023-04-20 LAB
ALBUMIN SERPL ELPH-MCNC: 3.2 G/DL — LOW (ref 3.3–5)
ALP SERPL-CCNC: 100 U/L — SIGNIFICANT CHANGE UP (ref 40–120)
ALT FLD-CCNC: 27 U/L — SIGNIFICANT CHANGE UP (ref 10–45)
ANION GAP SERPL CALC-SCNC: 15 MMOL/L — SIGNIFICANT CHANGE UP (ref 5–17)
ANISOCYTOSIS BLD QL: SLIGHT — SIGNIFICANT CHANGE UP
AST SERPL-CCNC: 49 U/L — HIGH (ref 10–40)
BASOPHILS # BLD AUTO: 0 K/UL — SIGNIFICANT CHANGE UP (ref 0–0.2)
BASOPHILS NFR BLD AUTO: 0 % — SIGNIFICANT CHANGE UP (ref 0–2)
BILIRUB SERPL-MCNC: 2 MG/DL — HIGH (ref 0.2–1.2)
BUN SERPL-MCNC: 24 MG/DL — HIGH (ref 7–23)
CALCIUM SERPL-MCNC: 9.8 MG/DL — SIGNIFICANT CHANGE UP (ref 8.4–10.5)
CHLORIDE SERPL-SCNC: 94 MMOL/L — LOW (ref 96–108)
CK SERPL-CCNC: 432 U/L — HIGH (ref 25–170)
CO2 SERPL-SCNC: 23 MMOL/L — SIGNIFICANT CHANGE UP (ref 22–31)
CREAT SERPL-MCNC: 1.26 MG/DL — SIGNIFICANT CHANGE UP (ref 0.5–1.3)
DACRYOCYTES BLD QL SMEAR: SLIGHT — SIGNIFICANT CHANGE UP
EGFR: 48 ML/MIN/1.73M2 — LOW
EOSINOPHIL # BLD AUTO: 0 K/UL — SIGNIFICANT CHANGE UP (ref 0–0.5)
EOSINOPHIL NFR BLD AUTO: 0 % — SIGNIFICANT CHANGE UP (ref 0–6)
GIANT PLATELETS BLD QL SMEAR: PRESENT — SIGNIFICANT CHANGE UP
GLUCOSE SERPL-MCNC: 114 MG/DL — HIGH (ref 70–99)
HCT VFR BLD CALC: 43.3 % — SIGNIFICANT CHANGE UP (ref 34.5–45)
HGB BLD-MCNC: 14.7 G/DL — SIGNIFICANT CHANGE UP (ref 11.5–15.5)
LACTATE SERPL-SCNC: 2.7 MMOL/L — HIGH (ref 0.5–2)
LYMPHOCYTES # BLD AUTO: 1.17 K/UL — SIGNIFICANT CHANGE UP (ref 1–3.3)
LYMPHOCYTES # BLD AUTO: 11.3 % — LOW (ref 13–44)
MACROCYTES BLD QL: SLIGHT — SIGNIFICANT CHANGE UP
MAGNESIUM SERPL-MCNC: 2.2 MG/DL — SIGNIFICANT CHANGE UP (ref 1.6–2.6)
MANUAL SMEAR VERIFICATION: SIGNIFICANT CHANGE UP
MCHC RBC-ENTMCNC: 33.9 GM/DL — SIGNIFICANT CHANGE UP (ref 32–36)
MCHC RBC-ENTMCNC: 38 PG — HIGH (ref 27–34)
MCV RBC AUTO: 111.9 FL — HIGH (ref 80–100)
MONOCYTES # BLD AUTO: 0.45 K/UL — SIGNIFICANT CHANGE UP (ref 0–0.9)
MONOCYTES NFR BLD AUTO: 4.3 % — SIGNIFICANT CHANGE UP (ref 2–14)
NEUTROPHILS # BLD AUTO: 8.74 K/UL — HIGH (ref 1.8–7.4)
NEUTROPHILS NFR BLD AUTO: 84.4 % — HIGH (ref 43–77)
OVALOCYTES BLD QL SMEAR: SLIGHT — SIGNIFICANT CHANGE UP
PLAT MORPH BLD: ABNORMAL
PLATELET # BLD AUTO: 289 K/UL — SIGNIFICANT CHANGE UP (ref 150–400)
POIKILOCYTOSIS BLD QL AUTO: SLIGHT — SIGNIFICANT CHANGE UP
POLYCHROMASIA BLD QL SMEAR: SLIGHT — SIGNIFICANT CHANGE UP
POTASSIUM SERPL-MCNC: 4.7 MMOL/L — SIGNIFICANT CHANGE UP (ref 3.5–5.3)
POTASSIUM SERPL-SCNC: 4.7 MMOL/L — SIGNIFICANT CHANGE UP (ref 3.5–5.3)
PROT SERPL-MCNC: 7 G/DL — SIGNIFICANT CHANGE UP (ref 6–8.3)
RBC # BLD: 3.87 M/UL — SIGNIFICANT CHANGE UP (ref 3.8–5.2)
RBC # FLD: 13.6 % — SIGNIFICANT CHANGE UP (ref 10.3–14.5)
RBC BLD AUTO: ABNORMAL
SODIUM SERPL-SCNC: 132 MMOL/L — LOW (ref 135–145)
WBC # BLD: 10.36 K/UL — SIGNIFICANT CHANGE UP (ref 3.8–10.5)
WBC # FLD AUTO: 10.36 K/UL — SIGNIFICANT CHANGE UP (ref 3.8–10.5)

## 2023-04-20 PROCEDURE — 71045 X-RAY EXAM CHEST 1 VIEW: CPT | Mod: 26

## 2023-04-20 PROCEDURE — 99285 EMERGENCY DEPT VISIT HI MDM: CPT

## 2023-04-20 RX ORDER — SODIUM CHLORIDE 9 MG/ML
1000 INJECTION INTRAMUSCULAR; INTRAVENOUS; SUBCUTANEOUS ONCE
Refills: 0 | Status: COMPLETED | OUTPATIENT
Start: 2023-04-20 | End: 2023-04-20

## 2023-04-20 RX ORDER — NYSTATIN CREAM 100000 [USP'U]/G
1 CREAM TOPICAL ONCE
Refills: 0 | Status: COMPLETED | OUTPATIENT
Start: 2023-04-20 | End: 2023-04-20

## 2023-04-20 RX ORDER — ACETAMINOPHEN 500 MG
1000 TABLET ORAL ONCE
Refills: 0 | Status: COMPLETED | OUTPATIENT
Start: 2023-04-20 | End: 2023-04-20

## 2023-04-20 RX ADMIN — SODIUM CHLORIDE 1000 MILLILITER(S): 9 INJECTION INTRAMUSCULAR; INTRAVENOUS; SUBCUTANEOUS at 22:30

## 2023-04-20 RX ADMIN — Medication 400 MILLIGRAM(S): at 21:05

## 2023-04-20 RX ADMIN — SODIUM CHLORIDE 1000 MILLILITER(S): 9 INJECTION INTRAMUSCULAR; INTRAVENOUS; SUBCUTANEOUS at 21:05

## 2023-04-20 RX ADMIN — Medication 1000 MILLIGRAM(S): at 21:40

## 2023-04-20 RX ADMIN — NYSTATIN CREAM 1 APPLICATION(S): 100000 CREAM TOPICAL at 22:56

## 2023-04-20 NOTE — ED ADULT NURSE NOTE - OBJECTIVE STATEMENT
Patient presents to ED c/o generalized weakness and pain s/p sitting on the toilet for 2 days. Patient states that she got stuck when on the toilet on Tuesday night and was unable to stand up due to being too weak to lift herself up. PMH DM and HTN. NKA status confirmed. Patient presents with erythematous skin with malodor to the under breasts, perineal area and coccyx region. Patient states that she left her phone in another room and her  is currently in a nursing home so she was home by herself. Patient usually uses a walker to walk. Patient presents alert and oriented to person, place, time, and situation and speaks in full sentences without difficulty, grimacing in stretcher due to pain from laying on back.

## 2023-04-20 NOTE — ED ADULT TRIAGE NOTE - CHIEF COMPLAINT QUOTE
BIBEMS co generalized weakness. Says she has been sitting on her toilet seat since Tuesday morning and was unable to stand up.  called 911 concerned since he had not heard from her in 2 days.  in field.

## 2023-04-21 ENCOUNTER — INPATIENT (INPATIENT)
Facility: HOSPITAL | Age: 63
LOS: 6 days | Discharge: NURSING, REHAB OR LONG-TERM CARE | DRG: 552 | End: 2023-04-28
Attending: INTERNAL MEDICINE | Admitting: INTERNAL MEDICINE
Payer: COMMERCIAL

## 2023-04-21 DIAGNOSIS — I10 ESSENTIAL (PRIMARY) HYPERTENSION: ICD-10-CM

## 2023-04-21 DIAGNOSIS — R53.1 WEAKNESS: ICD-10-CM

## 2023-04-21 DIAGNOSIS — N39.0 URINARY TRACT INFECTION, SITE NOT SPECIFIED: ICD-10-CM

## 2023-04-21 DIAGNOSIS — M19.90 UNSPECIFIED OSTEOARTHRITIS, UNSPECIFIED SITE: ICD-10-CM

## 2023-04-21 DIAGNOSIS — E87.1 HYPO-OSMOLALITY AND HYPONATREMIA: ICD-10-CM

## 2023-04-21 DIAGNOSIS — D75.89 OTHER SPECIFIED DISEASES OF BLOOD AND BLOOD-FORMING ORGANS: ICD-10-CM

## 2023-04-21 DIAGNOSIS — Z29.9 ENCOUNTER FOR PROPHYLACTIC MEASURES, UNSPECIFIED: ICD-10-CM

## 2023-04-21 DIAGNOSIS — R17 UNSPECIFIED JAUNDICE: ICD-10-CM

## 2023-04-21 DIAGNOSIS — F10.10 ALCOHOL ABUSE, UNCOMPLICATED: ICD-10-CM

## 2023-04-21 LAB
A1C WITH ESTIMATED AVERAGE GLUCOSE RESULT: 4.8 % — SIGNIFICANT CHANGE UP (ref 4–5.6)
ALBUMIN SERPL ELPH-MCNC: 2.5 G/DL — LOW (ref 3.3–5)
ALP SERPL-CCNC: 69 U/L — SIGNIFICANT CHANGE UP (ref 40–120)
ALT FLD-CCNC: 22 U/L — SIGNIFICANT CHANGE UP (ref 10–45)
ANION GAP SERPL CALC-SCNC: 10 MMOL/L — SIGNIFICANT CHANGE UP (ref 5–17)
ANION GAP SERPL CALC-SCNC: 8 MMOL/L — SIGNIFICANT CHANGE UP (ref 5–17)
APPEARANCE UR: ABNORMAL
AST SERPL-CCNC: 29 U/L — SIGNIFICANT CHANGE UP (ref 10–40)
BACTERIA # UR AUTO: PRESENT /HPF
BASOPHILS # BLD AUTO: 0.02 K/UL — SIGNIFICANT CHANGE UP (ref 0–0.2)
BASOPHILS NFR BLD AUTO: 0.3 % — SIGNIFICANT CHANGE UP (ref 0–2)
BILIRUB DIRECT SERPL-MCNC: 0.3 MG/DL — SIGNIFICANT CHANGE UP (ref 0–0.3)
BILIRUB INDIRECT FLD-MCNC: 1 MG/DL — SIGNIFICANT CHANGE UP (ref 0.2–1)
BILIRUB SERPL-MCNC: 0.9 MG/DL — SIGNIFICANT CHANGE UP (ref 0.2–1.2)
BILIRUB SERPL-MCNC: 1.3 MG/DL — HIGH (ref 0.2–1.2)
BILIRUB UR-MCNC: ABNORMAL
BUN SERPL-MCNC: 21 MG/DL — SIGNIFICANT CHANGE UP (ref 7–23)
BUN SERPL-MCNC: 24 MG/DL — HIGH (ref 7–23)
CALCIUM SERPL-MCNC: 8.4 MG/DL — SIGNIFICANT CHANGE UP (ref 8.4–10.5)
CALCIUM SERPL-MCNC: 8.5 MG/DL — SIGNIFICANT CHANGE UP (ref 8.4–10.5)
CHLORIDE SERPL-SCNC: 102 MMOL/L — SIGNIFICANT CHANGE UP (ref 96–108)
CHLORIDE SERPL-SCNC: 104 MMOL/L — SIGNIFICANT CHANGE UP (ref 96–108)
CO2 SERPL-SCNC: 20 MMOL/L — LOW (ref 22–31)
CO2 SERPL-SCNC: 23 MMOL/L — SIGNIFICANT CHANGE UP (ref 22–31)
COLOR SPEC: YELLOW — SIGNIFICANT CHANGE UP
COMMENT - URINE: SIGNIFICANT CHANGE UP
CREAT SERPL-MCNC: 0.93 MG/DL — SIGNIFICANT CHANGE UP (ref 0.5–1.3)
CREAT SERPL-MCNC: 1.11 MG/DL — SIGNIFICANT CHANGE UP (ref 0.5–1.3)
DIFF PNL FLD: ABNORMAL
EGFR: 56 ML/MIN/1.73M2 — LOW
EGFR: 69 ML/MIN/1.73M2 — SIGNIFICANT CHANGE UP
EOSINOPHIL # BLD AUTO: 0.12 K/UL — SIGNIFICANT CHANGE UP (ref 0–0.5)
EOSINOPHIL NFR BLD AUTO: 1.8 % — SIGNIFICANT CHANGE UP (ref 0–6)
EPI CELLS # UR: SIGNIFICANT CHANGE UP /HPF (ref 0–5)
ESTIMATED AVERAGE GLUCOSE: 91 MG/DL — SIGNIFICANT CHANGE UP (ref 68–114)
FOLATE SERPL-MCNC: 3.1 NG/ML — LOW
GGT SERPL-CCNC: 188 U/L — HIGH (ref 8–40)
GLUCOSE SERPL-MCNC: 104 MG/DL — HIGH (ref 70–99)
GLUCOSE SERPL-MCNC: 94 MG/DL — SIGNIFICANT CHANGE UP (ref 70–99)
GLUCOSE UR QL: NEGATIVE — SIGNIFICANT CHANGE UP
HCT VFR BLD CALC: 37.9 % — SIGNIFICANT CHANGE UP (ref 34.5–45)
HGB BLD-MCNC: 12.5 G/DL — SIGNIFICANT CHANGE UP (ref 11.5–15.5)
IMM GRANULOCYTES NFR BLD AUTO: 0.3 % — SIGNIFICANT CHANGE UP (ref 0–0.9)
KETONES UR-MCNC: ABNORMAL MG/DL
LACTATE SERPL-SCNC: 1.3 MMOL/L — SIGNIFICANT CHANGE UP (ref 0.5–2)
LEUKOCYTE ESTERASE UR-ACNC: ABNORMAL
LIDOCAIN IGE QN: 9 U/L — SIGNIFICANT CHANGE UP (ref 7–60)
LYMPHOCYTES # BLD AUTO: 1.68 K/UL — SIGNIFICANT CHANGE UP (ref 1–3.3)
LYMPHOCYTES # BLD AUTO: 25.1 % — SIGNIFICANT CHANGE UP (ref 13–44)
MAGNESIUM SERPL-MCNC: 2.1 MG/DL — SIGNIFICANT CHANGE UP (ref 1.6–2.6)
MCHC RBC-ENTMCNC: 33 GM/DL — SIGNIFICANT CHANGE UP (ref 32–36)
MCHC RBC-ENTMCNC: 38.2 PG — HIGH (ref 27–34)
MCV RBC AUTO: 115.9 FL — HIGH (ref 80–100)
MONOCYTES # BLD AUTO: 0.57 K/UL — SIGNIFICANT CHANGE UP (ref 0–0.9)
MONOCYTES NFR BLD AUTO: 8.5 % — SIGNIFICANT CHANGE UP (ref 2–14)
NEUTROPHILS # BLD AUTO: 4.29 K/UL — SIGNIFICANT CHANGE UP (ref 1.8–7.4)
NEUTROPHILS NFR BLD AUTO: 64 % — SIGNIFICANT CHANGE UP (ref 43–77)
NITRITE UR-MCNC: POSITIVE
NRBC # BLD: 0 /100 WBCS — SIGNIFICANT CHANGE UP (ref 0–0)
PH UR: 6.5 — SIGNIFICANT CHANGE UP (ref 5–8)
PHOSPHATE SERPL-MCNC: 4.2 MG/DL — SIGNIFICANT CHANGE UP (ref 2.5–4.5)
PLATELET # BLD AUTO: 218 K/UL — SIGNIFICANT CHANGE UP (ref 150–400)
POTASSIUM SERPL-MCNC: 4.5 MMOL/L — SIGNIFICANT CHANGE UP (ref 3.5–5.3)
POTASSIUM SERPL-MCNC: 4.5 MMOL/L — SIGNIFICANT CHANGE UP (ref 3.5–5.3)
POTASSIUM SERPL-SCNC: 4.5 MMOL/L — SIGNIFICANT CHANGE UP (ref 3.5–5.3)
POTASSIUM SERPL-SCNC: 4.5 MMOL/L — SIGNIFICANT CHANGE UP (ref 3.5–5.3)
PROT SERPL-MCNC: 5.2 G/DL — LOW (ref 6–8.3)
PROT UR-MCNC: 100 MG/DL
RBC # BLD: 3.27 M/UL — LOW (ref 3.8–5.2)
RBC # FLD: 13.5 % — SIGNIFICANT CHANGE UP (ref 10.3–14.5)
RBC CASTS # UR COMP ASSIST: ABNORMAL /HPF
SODIUM SERPL-SCNC: 133 MMOL/L — LOW (ref 135–145)
SODIUM SERPL-SCNC: 134 MMOL/L — LOW (ref 135–145)
SP GR SPEC: 1.02 — SIGNIFICANT CHANGE UP (ref 1–1.03)
TSH SERPL-MCNC: 2.55 UIU/ML — SIGNIFICANT CHANGE UP (ref 0.27–4.2)
UROBILINOGEN FLD QL: 1 E.U./DL — SIGNIFICANT CHANGE UP
VIT B12 SERPL-MCNC: 304 PG/ML — SIGNIFICANT CHANGE UP (ref 232–1245)
WBC # BLD: 6.7 K/UL — SIGNIFICANT CHANGE UP (ref 3.8–10.5)
WBC # FLD AUTO: 6.7 K/UL — SIGNIFICANT CHANGE UP (ref 3.8–10.5)
WBC UR QL: ABNORMAL /HPF

## 2023-04-21 PROCEDURE — 99222 1ST HOSP IP/OBS MODERATE 55: CPT

## 2023-04-21 PROCEDURE — 76705 ECHO EXAM OF ABDOMEN: CPT | Mod: 26

## 2023-04-21 PROCEDURE — 99223 1ST HOSP IP/OBS HIGH 75: CPT | Mod: GC

## 2023-04-21 PROCEDURE — 70450 CT HEAD/BRAIN W/O DYE: CPT | Mod: 26

## 2023-04-21 RX ORDER — ENOXAPARIN SODIUM 100 MG/ML
40 INJECTION SUBCUTANEOUS EVERY 24 HOURS
Refills: 0 | Status: DISCONTINUED | OUTPATIENT
Start: 2023-04-22 | End: 2023-04-28

## 2023-04-21 RX ORDER — AMLODIPINE BESYLATE 2.5 MG/1
5 TABLET ORAL DAILY
Refills: 0 | Status: DISCONTINUED | OUTPATIENT
Start: 2023-04-21 | End: 2023-04-21

## 2023-04-21 RX ORDER — BNT162B2 ORIGINAL AND OMICRON BA.4/BA.5 .1125; .1125 MG/2.25ML; MG/2.25ML
0.3 INJECTION, SUSPENSION INTRAMUSCULAR ONCE
Refills: 0 | Status: DISCONTINUED | OUTPATIENT
Start: 2023-04-21 | End: 2023-04-21

## 2023-04-21 RX ORDER — AMLODIPINE BESYLATE 2.5 MG/1
5 TABLET ORAL DAILY
Refills: 0 | Status: DISCONTINUED | OUTPATIENT
Start: 2023-04-21 | End: 2023-04-28

## 2023-04-21 RX ORDER — ACETAMINOPHEN 500 MG
650 TABLET ORAL EVERY 6 HOURS
Refills: 0 | Status: DISCONTINUED | OUTPATIENT
Start: 2023-04-21 | End: 2023-04-28

## 2023-04-21 RX ORDER — FOLIC ACID 0.8 MG
1 TABLET ORAL DAILY
Refills: 0 | Status: DISCONTINUED | OUTPATIENT
Start: 2023-04-21 | End: 2023-04-28

## 2023-04-21 RX ORDER — CEFTRIAXONE 500 MG/1
1000 INJECTION, POWDER, FOR SOLUTION INTRAMUSCULAR; INTRAVENOUS EVERY 24 HOURS
Refills: 0 | Status: COMPLETED | OUTPATIENT
Start: 2023-04-22 | End: 2023-04-24

## 2023-04-21 RX ORDER — THIAMINE MONONITRATE (VIT B1) 100 MG
500 TABLET ORAL EVERY 8 HOURS
Refills: 0 | Status: DISCONTINUED | OUTPATIENT
Start: 2023-04-21 | End: 2023-04-26

## 2023-04-21 RX ORDER — CEFTRIAXONE 500 MG/1
1000 INJECTION, POWDER, FOR SOLUTION INTRAMUSCULAR; INTRAVENOUS ONCE
Refills: 0 | Status: COMPLETED | OUTPATIENT
Start: 2023-04-21 | End: 2023-04-21

## 2023-04-21 RX ADMIN — CEFTRIAXONE 100 MILLIGRAM(S): 500 INJECTION, POWDER, FOR SOLUTION INTRAMUSCULAR; INTRAVENOUS at 02:10

## 2023-04-21 RX ADMIN — AMLODIPINE BESYLATE 5 MILLIGRAM(S): 2.5 TABLET ORAL at 14:02

## 2023-04-21 RX ADMIN — Medication 105 MILLIGRAM(S): at 11:59

## 2023-04-21 RX ADMIN — Medication 1 TABLET(S): at 11:59

## 2023-04-21 RX ADMIN — Medication 1 APPLICATION(S): at 06:32

## 2023-04-21 RX ADMIN — Medication 105 MILLIGRAM(S): at 19:40

## 2023-04-21 RX ADMIN — Medication 1 MILLIGRAM(S): at 12:00

## 2023-04-21 RX ADMIN — Medication 1 APPLICATION(S): at 19:40

## 2023-04-21 NOTE — ED PROVIDER NOTE - ATTENDING APP SHARED VISIT CONTRIBUTION OF CARE
Agree as documented.  Patient presenting s/p unable to get off toilet for multiple days. Her exam is significant for a fungal rash to the breast and pelvic region. She has no focal weakness or overt signs of cord compression/GBS/stroke that would cause her to be unable to stand. She will need medical work up and admission for PT and likely AJ. HD stable upon evaluation, A&OX3, in no acute distress.

## 2023-04-21 NOTE — ED PROVIDER NOTE - NS ED ATTENDING STATEMENT MOD
This was a shared visit with the RANGEL. I reviewed and verified the documentation and independently performed the documented:

## 2023-04-21 NOTE — PROGRESS NOTE ADULT - PROBLEM SELECTOR PLAN 6
#Hyperbilirubinemia/elevated GGT-patient with slightly elevated BR, elevated GGT, and slight elevation in AST likely all secondary to Etoh.   - RUQ US without any acute process. #Hyponatremia-likely 2/2 hypotonic etiology given no po intake no fluids in past three days.  -Na improving with IVF

## 2023-04-21 NOTE — OCCUPATIONAL THERAPY INITIAL EVALUATION ADULT - PERTINENT HX OF CURRENT PROBLEM, REHAB EVAL
63 year old female with medical history significant for PAD, alcohol use disorder, and degenerative arthritis in her spine, former smoker (quit 3.2020) coming in from her home by EMS due to inability to care for herself at home. She has been feeling increasingly weak for the past week, and as such, was reportedly sitting on the toilet for two days. She says she went to the toilet on Tuesday afternoon, and left her walker which she is reliant on, in the hallway. She then was too weak and could not ambulate to get up, and as such as been in place without food or water for two days. Her , who lives in a nursing home, had not heard from her for two days, and called EMS, who found her at her home. Of note, she notes a erythematous painful rash under her breast and in her pelvic area that she believes worsened over the past two days.

## 2023-04-21 NOTE — PROGRESS NOTE ADULT - PROBLEM SELECTOR PLAN 4
-likely 2/2 etoh use.  -follow b12/folate  - vitamin supplementation as above. Drinks 6-7 cans of beer "for as long as she can remember." As per patient, she has no recollection of ever withdrawing from alcohol; however, does not believe she has abstained for more than 3-4 days. Last drink was Tuesday AM. On exam, CIWA 0.   -CIWA protocol

## 2023-04-21 NOTE — PHYSICAL THERAPY INITIAL EVALUATION ADULT - GAIT DEVIATIONS NOTED, PT EVAL
decreased daniela/increased time in double stance/decreased velocity of limb motion/decreased step length/decreased stride length

## 2023-04-21 NOTE — PROGRESS NOTE ADULT - PROBLEM SELECTOR PLAN 2
Patient with week-long history of weakness, likely 2/2 UTI. UA positive. Patient not endorsing urinary symptoms.   -follow urine cultures blood cultures  -continue with ctx 1gQD      #Fungal Rash-patient presented with erythemmatous rash under breast and in groin, likely 2/2 fungal etiology.  -continue with nystatin powder and clotrimazole cream.

## 2023-04-21 NOTE — ED PROVIDER NOTE - OBJECTIVE STATEMENT
64 yo female  with h/o degenerative arthritis in her spine BIBEMS from home for evaluation. Pt reports she was sitting on her toilet for 2 days and couldn't get5 up by herself. Pt ambulatory with walker at the baseline. States her walker was in the hallway and she couldn't get to it. Pt states she was sitting w/o food, w/o drinking any water. Pt denies head injury or fall, denies weakness to her extremities, denies cough, cold, SOB, CP, abdominal pain, diarrhea, constipations. Pt c/o painful rash under her breast and to her pelvic area that she developed while sitting for 2 days.

## 2023-04-21 NOTE — OCCUPATIONAL THERAPY INITIAL EVALUATION ADULT - GENERAL OBSERVATIONS, REHAB EVAL
DIMAS Walker clearing pt. for OOB. Pt. received semi-supine in bed,+primafit,+tele with brother bedside in NAD agreeable to therapy session

## 2023-04-21 NOTE — PATIENT PROFILE ADULT - FALL HARM RISK - HARM RISK INTERVENTIONS

## 2023-04-21 NOTE — PROGRESS NOTE ADULT - ASSESSMENT
Ms. Parsons is a 63 year old female with medical history significant for PAD (had been on aspirin and lipitor, though has since become incompliant and does not take any meds at home) and degenerative arthritis in her spine, former smoker (quit 3.2020) coming in from her home by EMS due to inability to care for herself at home, admitted for LE weakness.

## 2023-04-21 NOTE — PROGRESS NOTE ADULT - PROBLEM SELECTOR PLAN 1
DDx includes alcohol neuropathy, B12 neuropathy, deconditioning.     - CT head negative for acute process  - f/u neuro recs  - B12/Folate  - thiamine 500mg IV TID for 5 days  - MV  - folate  - PT recs AJ

## 2023-04-21 NOTE — H&P ADULT - PROBLEM SELECTOR PLAN 1
Patient with week-long history of weakness, likely 2/2 UTI. UA positive. Patient not endorsing urinary symptoms.   -follow urine cultures blood cultures  -continue with ctx 1gQD Patient with week-long history of weakness, likely 2/2 UTI. UA positive. Patient not endorsing urinary symptoms.   -follow urine cultures blood cultures  -continue with ctx 1gQD      #Fungal Rash-patient presented with erythemmatous rash under breast and in groin, likely 2/2 fungal etiology.  -continue with nystatin poiwder and clotrimazole cream.   -wound care

## 2023-04-21 NOTE — ED PROVIDER NOTE - MUSCULOSKELETAL, MLM
Spine and all extremities grossly appears normal, range of motion is not limited, no localized  joint tenderness

## 2023-04-21 NOTE — PROGRESS NOTE ADULT - PROBLEM SELECTOR PLAN 7
Patient with history of spine arthritis, reliant on walker.   -tylenol for pain  -lidocaine patch if needed.  -pt/ot as above #Hyperbilirubinemia/elevated GGT-patient with slightly elevated BR, elevated GGT, and slight elevation in AST likely all secondary to Etoh.   - RUQ US without any acute process.

## 2023-04-21 NOTE — H&P ADULT - ASSESSMENT
Ms. Parsons is a 63 year old female with medical history significant for PAD (had been on aspirin and lipitor, though has since become incompliant and does not take any meds at home) and degenerative arthritis in her spine, former smoker (quit 3.2020) coming in from her home by EMS due to inability to care for herself at home, admitted for SW evaluation and UTI.

## 2023-04-21 NOTE — OCCUPATIONAL THERAPY INITIAL EVALUATION ADULT - DIAGNOSIS, OT EVAL
Pt. admitted to [facility] for further workup and management after being found on toilet for 2 days, unable to get up. Upon assessment, pt. demo impairments in BUE/BLE strength, activity tolerance, balance and postural control as well as soreness around the sacrum.

## 2023-04-21 NOTE — H&P ADULT - PROBLEM SELECTOR PLAN 4
Patient with history of spine arthritis, reliant on walker.   -tylenol for pain  -lidocaine patch if needed.  -pt/ot as above -likely 2/2 etoh use.  -follow b12/folate    #Hyponatremia-likely 2/2 hypotonic etiology given no po intake no fluids in past three days.  -follow serum osm, urine studies  -repeat BMP in AM after 2L ivf -likely 2/2 etoh use.  -follow b12/folate    #Hyponatremia-likely 2/2 hypotonic etiology given no po intake no fluids in past three days.  -follow serum osm, urine studies  -repeat BMP in AM after 2L ivf    #Hyperbilirubinemia/elevated GGT-patient with slightly elevated BR, elevated GGT, and slight elevation in AST likely all secondary to Etoh. Given abd findings of TTp in RUQ, ddx includes liver pathology biliary pathology.  -follow RUQ US.  -follow up lipase

## 2023-04-21 NOTE — OCCUPATIONAL THERAPY INITIAL EVALUATION ADULT - MODIFIED CLINICAL TEST OF SENSORY INTEGRATION IN BALANCE TEST
At EOB, pt. engaged in approx 3 lateral side steps w. Mod Ax2 and RW, noted w. decreased weight shifting and step length

## 2023-04-21 NOTE — PHYSICAL THERAPY INITIAL EVALUATION ADULT - PERTINENT HX OF CURRENT PROBLEM, REHAB EVAL
63 year old female with medical history significant for PAD, alcohol use disorder, and degenerative arthritis in her spine, former smoker (quit 3.2020) coming in from her home by EMS due to inability to care for herself at home. She has been feeling increasingly weak for the past week, and as such, was reportedly sitting on the toilet for two days. She says she went to the toilet on Tuesday afternoon, and left her walker which she is reliant on, in the hallway. She then was too weak and could not ambulate to get up, and as such as been in place without food or water for two days. Her , who lives in a nursing home, had not heard from her for two days, and called EMS, who found her at her home. . Pt denies head injury or fall, denies cough, cold, SOB, CP, abdominal pain, diarrhea, constipation. Of note, she notes a erythematous painful rash under her breast and in her pelvic area that she believes worsened over the past two days. Denies any oozing from the rash. She drinks about 6-7 cans of beer daily for as long as she can remember and does not believe she has ever undergone withdrawal. Currently denying any bowel or bladder incontinence, though endorsing some  numbness in her upper legs and lower legs. States that she cannot walk on her legs since being stuck in the bathroom.

## 2023-04-21 NOTE — H&P ADULT - PROBLEM SELECTOR PLAN 5
F-2L Nacl  E-replete PRN  N-regular diet  DVT-lovenox Patient with history of spine arthritis, reliant on walker.   -tylenol for pain  -lidocaine patch if needed.  -pt/ot as above

## 2023-04-21 NOTE — ED PROVIDER NOTE - CLINICAL SUMMARY MEDICAL DECISION MAKING FREE TEXT BOX
62 yo female  with h/o degenerative arthritis in her spine BIBEMS from home for evaluation. Pt reports she was sitting on her toilet for 2 days and couldn't get5 up by herself. Pt ambulatory with walker at the baseline. States her walker was in the hallway and she couldn't get to it. Pt states she was sitting w/o food, w/o drinking any water. Pt denies head injury or fall, denies weakness to her extremities, denies cough, cold, SOB, CP, abdominal pain, diarrhea, constipations. Pt c/o painful rash under her breast and to her pelvic area that she developed while sitting for 2 days.   Pt afebrile, tachycardiac in the triage, appears dry and dehydrated. lungs- CTA b/l, abd- soft, NT, extensive erythematous rash

## 2023-04-21 NOTE — CONSULT NOTE ADULT - ASSESSMENT
63 year old female presented with new onset LE weakness started after being sitting on toilet from Monday PM to Thursday AM. She uses walker at baseline since about 10 years ago s/p RLE fibular surgery, she as was able to walk to bathroom w/o walker. Started experiencing b/l LE weakness after being moved from toilet seat. Currently neurological exam is noticeable for acute asymmetric LE weakness R>L with numbness over the common fibular nerve distribution. LLE SLR weakly positive as well. She also has PMHx of chronic EtOH which make her more prone to nerve injury.     Recommendations:   MRI lumbar spine w/o  PT/OT/PMNR  Labs: HgbA1c, B12 + Folate, SPEP and UPEP with immunofixation, TSH and T4, JEANNETTE, ESR, CRP, VDRL/RPR, Folic acid, c-ANCA, p-ANCA, Thiamin (B1), Methylmalonic acid and homocysteine, HCV and HBV.   Not final until attending attestation.   Thank you for the courtesy of this consult, Please contact us if any neurological changes or questions, neurology team will continue to follow. 63 year old female presented with new onset LE weakness started after being sitting on toilet from Monday PM to Thursday AM. She uses walker at baseline since about 10 years ago s/p RLE fibular surgery, she as was able to walk to bathroom w/o walker. Started experiencing b/l LE weakness after being moved from toilet seat. Currently neurological exam is noticeable for acute asymmetric LE weakness R>L with numbness over the common fibular nerve distribution. LLE SLR weakly positive as well. She also has PMHx of chronic EtOH which make her more prone to nerve injury.     Recommendations:   MRI lumbar spine w/o  PT/OT/PMNR  Labs: HgbA1c, B12 + Folate, SPEP and UPEP with immunofixation, TSH and T4, JEANNETTE, ESR, CRP, VDRL/RPR, Folic acid, c-ANCA, p-ANCA, Thiamin (B1), Methylmalonic acid and homocysteine, HCV and HBV.     Thank you for the courtesy of this consult, Please contact us if any neurological changes or questions, neurology team will continue to follow.

## 2023-04-21 NOTE — H&P ADULT - ATTENDING COMMENTS
PCP: Dr. Gonzalez - last seen 10/2019  63F w PAD EtOH use (6-7 cans/d), p/w weakness – unable to get up from toilet for several days and  in [facility] called EMS, found ot have acute cystitis, ELIDA, admitted for deconditioning, monitoring for EtOH w/d    Pt seen this morning with brother at bedside.   States overall feeling better - eating wo issues. Denies any dysuria, frequency but does state she was unable to get up from toilet for 2 days d/t not having walker. Did not have much to eat/drink for last 2 days. Denies h/o EtOH w/d - no anxiety, nausea, tremors. States  when he was at home drank more - 1 case beer/day.   Exam: female in NAD on RA, MMM, RRR, nml resp effort, CTAB, Abd soft, NABS, non-tender, A/O x3, no tremors or asterixis, 5/5 in BUE, 4/5 in BLE. sensation intact    #ELIDA - Cr improving 0.9 from 1.2 on admission. Likely d/t decreased PO intake  #Acute cysitis - on IV CTX, f/u UCx and sensitivities  #BLE Weakness - pt reports improving. symmetric w numbness - possibly  #h/o DM2 - A1C in 6s previously  - adding on    #EtOH use d/o - does not appear in w/d at this time. 6-7 cans (12oz)/d   - RUQ US showing steatosis   #HTN - BP elevated - starting amlodipine 5 w hold parameters    Plan  CT Head non-con for weakness - no acute findings  Monitor CIWA - does not appear in w/d at this time  Continue IV CTX at this time, f/u sensitivities  PT OT - recommended AJ    ppx: SQL  DISPO: AJ - will need auth

## 2023-04-21 NOTE — H&P ADULT - NSHPPHYSICALEXAM_GEN_ALL_CORE
.  VITAL SIGNS:  T(C): 36.6 (04-21-23 @ 02:35), Max: 37.5 (04-20-23 @ 20:20)  T(F): 97.9 (04-21-23 @ 02:35), Max: 99.5 (04-20-23 @ 20:20)  HR: 80 (04-21-23 @ 02:35) (80 - 110)  BP: 148/69 (04-21-23 @ 02:35) (148/69 - 160/93)  BP(mean): --  RR: 18 (04-21-23 @ 02:35) (18 - 18)  SpO2: 98% (04-21-23 @ 02:35) (98% - 99%)  Wt(kg): --    PHYSICAL EXAM:    Constitutional: WDWN resting comfortably in bed; NAD  Head: NC/AT  Eyes: PERRL, EOMI, clear conjunctiva  ENT: no nasal discharge; uvula midline, no oropharyngeal erythema or exudates; MMM  Neck: supple; no JVD or thyromegaly  Respiratory: CTA B/L; no W/R/R, no retractions  Cardiac: +S1/S2; RRR; no M/R/G; PMI non-displaced  Gastrointestinal: soft, NT/ND; no rebound or guarding; +BSx4  Genitourinary: normal external genitalia  Back: spine midline, no bony tenderness or step-offs; no CVAT B/L  Extremities: WWP, no clubbing or cyanosis; no peripheral edema  Musculoskeletal: NROM x4; no joint swelling, tenderness or erythema  Vascular: 2+ radial, femoral, DP/PT pulses B/L  Dermatologic: skin warm, dry and intact; no rashes, wounds, or scars  Lymphatic: no submandibular or cervical LAD  Neurologic: AAOx3; CNII-XII grossly intact; no focal deficits  Psychiatric: affect and characteristics of appearance, verbalizations, behaviors are appropriate .  VITAL SIGNS:  T(C): 36.6 (04-21-23 @ 02:35), Max: 37.5 (04-20-23 @ 20:20)  T(F): 97.9 (04-21-23 @ 02:35), Max: 99.5 (04-20-23 @ 20:20)  HR: 80 (04-21-23 @ 02:35) (80 - 110)  BP: 148/69 (04-21-23 @ 02:35) (148/69 - 160/93)  BP(mean): --  RR: 18 (04-21-23 @ 02:35) (18 - 18)  SpO2: 98% (04-21-23 @ 02:35) (98% - 99%)  Wt(kg): --    PHYSICAL EXAM:    Constitutional: nad, appears comfortable  Head: NC/AT  Eyes: PERRL, EOMI, clear conjunctiva  ENT: no nasal discharge; uvula midline, no oropharyngeal erythema or exudates; MMM  Neck: supple; no JVD or thyromegaly  Respiratory: CTA B/L; no W/R/R, no retractions  Cardiac: +S1/S2; RRR; no M/R/G; PMI non-displaced  Gastrointestinal: soft, NT/ND; no rebound or guarding; +BSx4  Back: spine midline, no bony tenderness or step-offs; no CVAT B/L  Extremities: WWP, no clubbing or cyanosis; no peripheral edema  Musculoskeletal: NROM x4; no joint swelling, tenderness or erythema, strength 3/5, sensation intact to pinprick but not not soft touch in bilatreal LE  Vascular: 2+ radial, femoral, DP/PT pulses B/L  Dermatologic: diffuse erythematous rash, no macules or papules, no oozing. noted under breasts bilaterally and groin  Lymphatic: no submandibular or cervical LAD  Neurologic: AAOx3; CNII-XII grossly intact; no focal deficits  Psychiatric: affect and characteristics of appearance, verbalizations, behaviors are appropriate .  VITAL SIGNS:  T(C): 36.6 (04-21-23 @ 02:35), Max: 37.5 (04-20-23 @ 20:20)  T(F): 97.9 (04-21-23 @ 02:35), Max: 99.5 (04-20-23 @ 20:20)  HR: 80 (04-21-23 @ 02:35) (80 - 110)  BP: 148/69 (04-21-23 @ 02:35) (148/69 - 160/93)  BP(mean): --  RR: 18 (04-21-23 @ 02:35) (18 - 18)  SpO2: 98% (04-21-23 @ 02:35) (98% - 99%)  Wt(kg): --    PHYSICAL EXAM:    Constitutional: nad, appears comfortable  Head: NC/AT  Eyes: PERRL, EOMI, clear conjunctiva  ENT: no nasal discharge; uvula midline, no oropharyngeal erythema or exudates; MMM  Neck: supple; no JVD or thyromegaly  Respiratory: CTA B/L; no W/R/R, no retractions  Cardiac: +S1/S2; RRR; no M/R/G; PMI non-displaced  Gastrointestinal: soft, NT/ND; slight tenderness in RUQ and RLQ.  Back: spine midline, no bony tenderness or step-offs; no CVAT B/L  Extremities: WWP, no clubbing or cyanosis; no peripheral edema  Musculoskeletal: NROM x4; no joint swelling, tenderness or erythema, strength 3/5, sensation intact to pinprick but not not soft touch in bilatreal LE  Vascular: 2+ radial, femoral, DP/PT pulses B/L  Dermatologic: diffuse erythematous rash, no macules or papules, no oozing. noted under breasts bilaterally and groin  Lymphatic: no submandibular or cervical LAD  Neurologic: AAOx3; CNII-XII grossly intact; no focal deficits  Psychiatric: affect and characteristics of appearance, verbalizations, behaviors are appropriate

## 2023-04-21 NOTE — PROGRESS NOTE ADULT - PROBLEM SELECTOR PLAN 5
#Hyponatremia-likely 2/2 hypotonic etiology given no po intake no fluids in past three days.  -Na improving with IVF -likely 2/2 etoh use.  -follow b12/folate  - vitamin supplementation as above.

## 2023-04-21 NOTE — CONSULT NOTE ADULT - ASSESSMENT
IM    63 year old female with medical history significant for PAD (had been on aspirin and lipitor, though has since become incompliant and does not take any meds at home) and degenerative arthritis in her spine, former smoker (quit 3.2020) coming in from her home by EMS due to inability to care for herself at home, admitted for SW evaluation and UTI.     Problem/Plan - 1:  ·  Problem: UTI (urinary tract infection).   ·  Plan: Patient with week-long history of weakness, likely 2/2 UTI. UA positive. Patient not endorsing urinary symptoms.   -follow urine cultures blood cultures  -continue with ctx 1gQD    #Fungal Rash-patient presented with erythemmatous rash under breast and in groin, likely 2/2 fungal etiology.  -continue with nystatin poiwder and clotrimazole cream.   -wound care.    Problem/Plan - 2:  ·  Problem: Weakness.   ·  Plan: DDx includes infection as above versus deconditioning versus depression given  recently diagnosed with cancer.   -SW/CM consult   -B12/Folate/TSH  -Nutrition consult  -PT/OT  -strength 3/5 on exam, given history of spinal arthritis and acute weakness, MR thoracic and lumbar spine ordered.    Problem/Plan - 3:  ·  Problem: AA (alcohol abuse).   ·  Plan: Drinks 6-7 cans of beer "for as long as she can remember." As per patient, she has no recollection of ever withdrawing from alcohol; however, does not believe she has abstained for more than 3-4 days. Last drink was Tuesday AM. On exam, CIWA 0.   -CIWA protocol.    Problem/Plan - 4:  ·  Problem: Macrocytosis.   ·  Plan: -likely 2/2 etoh use.  -follow b12/folate    #Hyponatremia-likely 2/2 hypotonic etiology given no po intake no fluids in past three days.  -follow serum osm, urine studies  -repeat BMP in AM after 2L ivf    #Hyperbilirubinemia/elevated GGT-patient with slightly elevated BR, elevated GGT, and slight elevation in AST likely all secondary to Etoh. Given abd findings of TTp in RUQ, ddx includes liver pathology biliary pathology.  -follow RUQ US.  -follow up lipase.    Problem/Plan - 5:  ·  Problem: Arthritis.   ·  Plan: Patient with history of spine arthritis, reliant on walker.   -tylenol for pain  -lidocaine patch if needed.  -pt/ot as above.    Problem/Plan - 6:  ·  Problem: Preventive measure.   ·  Plan: F-2L Nacl  E-replete PRN  N-regular diet  DVT-lovenox.

## 2023-04-21 NOTE — PROGRESS NOTE ADULT - PROBLEM SELECTOR PLAN 3
Drinks 6-7 cans of beer "for as long as she can remember." As per patient, she has no recollection of ever withdrawing from alcohol; however, does not believe she has abstained for more than 3-4 days. Last drink was Tuesday AM. On exam, CIWA 0.   -CIWA protocol Pt. BP elevated on admission.     - start amlodipine 5mg daily

## 2023-04-21 NOTE — PHYSICAL THERAPY INITIAL EVALUATION ADULT - ADDITIONAL COMMENTS
Pt resides in apt with steps to enter and steps inside, has shower chair. Pt's spouse is in [facility].

## 2023-04-21 NOTE — PHYSICAL THERAPY INITIAL EVALUATION ADULT - MODALITIES TREATMENT COMMENTS
CN testing - face symmetric, tongue midline, equal shoulder shrug, peripheral fields intact, tracking inconsistent (?) command following

## 2023-04-21 NOTE — OCCUPATIONAL THERAPY INITIAL EVALUATION ADULT - MODALITIES TREATMENT COMMENTS
Cranial Nerves II - XII: II: PERRLA; visual fields are full to confrontation III, IV, VI: EOMI, no nystagmus appreciated V: facial sensation intact to light touch V1-V3 b/l VII: no ptosis, no facial droop, symmetric eyebrow raise and closure VIII: hearing intact to finger rub b/l  XI: head turning and shoulder shrug intact b/l XII: tongue protrusion midline. Of note, pt. visual quadrant test WFL, during vision H test- noted with b/l frequent freezing/pauses with both up/down and left/right tracking

## 2023-04-21 NOTE — H&P ADULT - PROBLEM SELECTOR PLAN 3
MCV 111__  -follow b12/folate    #Hyponatremia-likely 2/2 hypotonic etiology given no po intake no fluids in past three days.  -follow serum osm, urine studies  -repeat BMP in AM after 2L ivf   -follow b12/folate    #Hyponatremia-likely 2/2 hypotonic etiology given no po intake no fluids in past three days.  -follow serum osm, urine studies  -repeat BMP in AM after 2L ivf Drinks 6-7 cans of beer "for as long as she can remember." As per patient, she has no recollection of ever withdrawing from alcohol; however, does not believe she has abstained for more than 3-4 days. Last drink was Tuesday AM. On exam, CIWA 0.   -CIWA protocol

## 2023-04-21 NOTE — H&P ADULT - PROBLEM SELECTOR PLAN 2
DDx includes infection as above versus deconditioning versus depression given  recently diagnosed with cancer.   -SW/CM consult   -B12/Folate/TSH  -Nutrition consult  -PT/OT DDx includes infection as above versus deconditioning versus depression given  recently diagnosed with cancer.   -SW/CM consult   -B12/Folate/TSH  -Nutrition consult  -PT/OT  -strength 3/5 on exam, given history of spinal arthritis and acute weakness, MR thoracic and lumbar spine ordered.

## 2023-04-21 NOTE — OCCUPATIONAL THERAPY INITIAL EVALUATION ADULT - ADDITIONAL COMMENTS
Of note, pt. admitted to hospital for inability to care for herself. She reports living in an elevator accessible building w. a ramp to enter, her  resides at [facility]. She reports I in ADLs, use of rollator for mobility and transfers. She has tub/shower + shower chair and a normal toilet

## 2023-04-21 NOTE — PROGRESS NOTE ADULT - SUBJECTIVE AND OBJECTIVE BOX
ARIES PHILIP 63y Female  MRN#: [id number]   CODE STATUS: FULL      SUBJECTIVE  Patient is a 63y old Female who presents with a chief complaint of Currently admitted to medicine with the primary diagnosis of Dehydration    Today is hospital day , and this morning she is resting in bed comfortably with increased strength prior to presentation.     Present Today:           Thorne Catheter (X)No/ ()Yes? Indication:          Central Line (X)No/ ()Yes? Indication:          IV Fluids (X)No/ ()Yes? Type:  Rate:  Indication:      OBJECTIVE  PAST MEDICAL & SURGICAL HISTORY  Peripheral artery disease    Former smoker    Arthritis      Home Meds:    ALLERGIES:  No Known Allergies    MEDICATIONS:  STANDING MEDICATIONS  amLODIPine   Tablet 5 milliGRAM(s) Oral daily  clotrimazole 1% Cream 1 Application(s) Topical two times a day  folic acid 1 milliGRAM(s) Oral daily  multivitamin 1 Tablet(s) Oral daily  thiamine IVPB 500 milliGRAM(s) IV Intermittent every 8 hours    PRN MEDICATIONS  acetaminophen     Tablet .. 650 milliGRAM(s) Oral every 6 hours PRN      VITAL SIGNS: Last 24 Hours  T(C): 36.5 (2023 12:09), Max: 37.5 (2023 20:20)  T(F): 97.7 (2023 12:09), Max: 99.5 (2023 20:20)  HR: 73 (2023 12:09) (71 - 86)  BP: 148/70 (2023 12:09) (147/79 - 158/91)  BP(mean): --  RR: 18 (2023 12:09) (16 - 18)  SpO2: 100% (2023 12:09) (98% - 100%)    LABS:                        12.5   6.70  )-----------( 218      ( 2023 05:30 )             37.9     04-21    134<L>  |  104  |  21  ----------------------------<  94  4.5   |  20<L>  |  0.93    Ca    8.5      2023 05:30  Phos  4.2     04-21  Mg     2.1     04-21    TPro  5.2<L>  /  Alb  2.5<L>  /  TBili  0.9  /  DBili  x   /  AST  29  /  ALT  22  /  AlkPhos  69  04-      Urinalysis Basic - ( 2023 00:42 )    Color: Yellow / Appearance: SL Cloudy / S.025 / pH: x  Gluc: x / Ketone: Trace mg/dL  / Bili: Small / Urobili: 1.0 E.U./dL   Blood: x / Protein: 100 mg/dL / Nitrite: POSITIVE   Leuk Esterase: Small / RBC: 5-10 /HPF / WBC 5-10 /HPF   Sq Epi: x / Non Sq Epi: x / Bacteria: Present /HPF        Lactate, Blood: 1.3 mmol/L (23 @ 00:59)  Creatine Kinase, Serum: 432 U/L *H* (23 @ 20:15)  Lactate, Blood: 2.7 mmol/L *H* (23 @ 20:15)      Urinalysis with Rflx Culture (collected 2023 00:42)    Culture - Blood (collected 2023 00:13)  Source: .Blood Blood  Preliminary Report (2023 13:00):    No growth at 12 hours    Culture - Blood (collected 2023 00:13)  Source: .Blood Blood  Preliminary Report (2023 13:00):    No growth at 12 hours      CARDIAC MARKERS ( 2023 20:15 )  x     / x     / 432 U/L / x     / x          RADIOLOGY:      PHYSICAL EXAM:  General: NAD, AAOx3  HEENT: atraumatic, normocephalic  Pulmonary: clear to auscultation bilaterally; No wheeze  Cardiovascular: Regular rate and rhythm; no murmurs, rubs or gallops. Normal S1S2  Gastrointestinal: Soft, nontender, nondistended; bowel sounds present  Musculoskeletal: 2+ peripheral pulses, no clubbing, cyanosis or edema, erythematous b/l chest rash under b/l breasts.   Neurology: Pt. alert and oriented, fluent speech,4/5 strength in b/l lower extremities with 5/5 strength in b/l upper extremities.   Skin: no rashes or lesions

## 2023-04-21 NOTE — H&P ADULT - HISTORY OF PRESENT ILLNESS
Ms. Parsons is a 63 year old female with medical history significant for PAD (had been on aspirin and lipitor, though has since become incompliant and does not take any meds at home) and degenerative arthritis in her spine, former smoker (quit 3.2020) coming in from her home by EMS due to inability to care for herself at home. She has been feeling increasingly weak for the past week, and as such, was reportedly sitting on the toilet for two days. She says she went to the toilet on Tuesday afternoon, and left her walker which she is reliant on, in the hallway. She then was too weak and could not ambulate to get up, and as such as been in place without food or water for two days. Her , who lives in a nursing home, had not heard from her for two days, and called EMS, who found her at her home. . Pt denies head injury or fall, denies cough, cold, SOB, CP, abdominal pain, diarrhea, constipation. Of note, she notes a erythematous painful rash under her breast and in her pelvic area that she believes worsened over the past two days. Denies any oozing from the rash.  In the ED: VSS, Labs s/f macrocytosis, lactate 2.7 > 1.3, hyponatremic 133, BUN 24, Cr 1.26, BR 2.0, AST 49, , UA positive  EKG NSR  Course Nystatin, Tylenol, CTX, 2L Nacl     Ms. Parsons is a 63 year old female with medical history significant for PAD, alcohol use disorder, and degenerative arthritis in her spine, former smoker (quit 3.2020) coming in from her home by EMS due to inability to care for herself at home. She has been feeling increasingly weak for the past week, and as such, was reportedly sitting on the toilet for two days. She says she went to the toilet on Tuesday afternoon, and left her walker which she is reliant on, in the hallway. She then was too weak and could not ambulate to get up, and as such as been in place without food or water for two days. Her , who lives in a nursing home, had not heard from her for two days, and called EMS, who found her at her home. . Pt denies head injury or fall, denies cough, cold, SOB, CP, abdominal pain, diarrhea, constipation. Of note, she notes a erythematous painful rash under her breast and in her pelvic area that she believes worsened over the past two days. Denies any oozing from the rash. She drinks about 6-7 cans of beer daily for as long as she can remember and does not believe she has ever undergone withdrawal. Currently denying any bowel or bladder incontinence, though endorsing some  numbness in her upper legs and lower legs. States that she cannot walk on her legs since being stuck in the bathroom.  In the ED: VSS, Labs s/f macrocytosis, lactate 2.7 > 1.3, hyponatremic 133, BUN 24, Cr 1.26, BR 2.0, AST 49, , UA positive  EKG NSR  Course Nystatin, Tylenol, CTX, 2L Nacl

## 2023-04-21 NOTE — H&P ADULT - NSHPLABSRESULTS_GEN_ALL_CORE
.  LABS:                         14.7   10.36 )-----------( 289      ( 2023 20:15 )             43.3         133<L>  |  102  |  24<H>  ----------------------------<  104<H>  4.5   |  23  |  1.11    Ca    8.4      2023 00:57  Mg     2.2         TPro  7.0  /  Alb  3.2<L>  /  TBili  2.0<H>  /  DBili  x   /  AST  49<H>  /  ALT  27  /  AlkPhos  100        Urinalysis Basic - ( 2023 00:42 )    Color: Yellow / Appearance: SL Cloudy / S.025 / pH: x  Gluc: x / Ketone: Trace mg/dL  / Bili: Small / Urobili: 1.0 E.U./dL   Blood: x / Protein: 100 mg/dL / Nitrite: POSITIVE   Leuk Esterase: Small / RBC: 5-10 /HPF / WBC 5-10 /HPF   Sq Epi: x / Non Sq Epi: x / Bacteria: Present /HPF      CARDIAC MARKERS ( 2023 20:15 )  x     / x     / 432 U/L / x     / x            Lactate, Blood: 1.3 mmol/L ( @ 00:59)  Lactate, Blood: 2.7 mmol/L ( @ 20:15)      RADIOLOGY, EKG & ADDITIONAL TESTS: Reviewed.

## 2023-04-21 NOTE — PHYSICAL THERAPY INITIAL EVALUATION ADULT - GENERAL OBSERVATIONS, REHAB EVAL
PT received in bed in no acute distress, +primafit, bed alarm. Pt presents with decreased functional strength and balance with transfers and ambulation.

## 2023-04-21 NOTE — CONSULT NOTE ADULT - ATTENDING COMMENTS
main new symptom other than weakness is right lateral leg numbness.  this is most suggestive of new lumbar radiculopathy.    MRI L spine w/o  inflammatory, nutritional labs, infectious labs to look for causes of aggressive neuropathies

## 2023-04-22 LAB
ANION GAP SERPL CALC-SCNC: 8 MMOL/L — SIGNIFICANT CHANGE UP (ref 5–17)
BASOPHILS # BLD AUTO: 0.02 K/UL — SIGNIFICANT CHANGE UP (ref 0–0.2)
BASOPHILS NFR BLD AUTO: 0.4 % — SIGNIFICANT CHANGE UP (ref 0–2)
BUN SERPL-MCNC: 13 MG/DL — SIGNIFICANT CHANGE UP (ref 7–23)
CALCIUM SERPL-MCNC: 8 MG/DL — LOW (ref 8.4–10.5)
CHLORIDE SERPL-SCNC: 106 MMOL/L — SIGNIFICANT CHANGE UP (ref 96–108)
CO2 SERPL-SCNC: 21 MMOL/L — LOW (ref 22–31)
CREAT SERPL-MCNC: 0.8 MG/DL — SIGNIFICANT CHANGE UP (ref 0.5–1.3)
EGFR: 82 ML/MIN/1.73M2 — SIGNIFICANT CHANGE UP
EOSINOPHIL # BLD AUTO: 0.08 K/UL — SIGNIFICANT CHANGE UP (ref 0–0.5)
EOSINOPHIL NFR BLD AUTO: 1.4 % — SIGNIFICANT CHANGE UP (ref 0–6)
GLUCOSE SERPL-MCNC: 103 MG/DL — HIGH (ref 70–99)
HCT VFR BLD CALC: 37.5 % — SIGNIFICANT CHANGE UP (ref 34.5–45)
HGB BLD-MCNC: 12.2 G/DL — SIGNIFICANT CHANGE UP (ref 11.5–15.5)
IMM GRANULOCYTES NFR BLD AUTO: 0.4 % — SIGNIFICANT CHANGE UP (ref 0–0.9)
LYMPHOCYTES # BLD AUTO: 2.37 K/UL — SIGNIFICANT CHANGE UP (ref 1–3.3)
LYMPHOCYTES # BLD AUTO: 42.2 % — SIGNIFICANT CHANGE UP (ref 13–44)
MAGNESIUM SERPL-MCNC: 2.2 MG/DL — SIGNIFICANT CHANGE UP (ref 1.6–2.6)
MCHC RBC-ENTMCNC: 32.5 GM/DL — SIGNIFICANT CHANGE UP (ref 32–36)
MCHC RBC-ENTMCNC: 38 PG — HIGH (ref 27–34)
MCV RBC AUTO: 116.8 FL — HIGH (ref 80–100)
MONOCYTES # BLD AUTO: 0.56 K/UL — SIGNIFICANT CHANGE UP (ref 0–0.9)
MONOCYTES NFR BLD AUTO: 10 % — SIGNIFICANT CHANGE UP (ref 2–14)
NEUTROPHILS # BLD AUTO: 2.56 K/UL — SIGNIFICANT CHANGE UP (ref 1.8–7.4)
NEUTROPHILS NFR BLD AUTO: 45.6 % — SIGNIFICANT CHANGE UP (ref 43–77)
NRBC # BLD: 0 /100 WBCS — SIGNIFICANT CHANGE UP (ref 0–0)
PHOSPHATE SERPL-MCNC: 3.6 MG/DL — SIGNIFICANT CHANGE UP (ref 2.5–4.5)
PLATELET # BLD AUTO: 219 K/UL — SIGNIFICANT CHANGE UP (ref 150–400)
POTASSIUM SERPL-MCNC: 4 MMOL/L — SIGNIFICANT CHANGE UP (ref 3.5–5.3)
POTASSIUM SERPL-SCNC: 4 MMOL/L — SIGNIFICANT CHANGE UP (ref 3.5–5.3)
RBC # BLD: 3.21 M/UL — LOW (ref 3.8–5.2)
RBC # FLD: 13.5 % — SIGNIFICANT CHANGE UP (ref 10.3–14.5)
SODIUM SERPL-SCNC: 135 MMOL/L — SIGNIFICANT CHANGE UP (ref 135–145)
WBC # BLD: 5.61 K/UL — SIGNIFICANT CHANGE UP (ref 3.8–10.5)
WBC # FLD AUTO: 5.61 K/UL — SIGNIFICANT CHANGE UP (ref 3.8–10.5)

## 2023-04-22 PROCEDURE — 99233 SBSQ HOSP IP/OBS HIGH 50: CPT

## 2023-04-22 RX ORDER — ASPIRIN/CALCIUM CARB/MAGNESIUM 324 MG
1 TABLET ORAL
Refills: 0 | DISCHARGE

## 2023-04-22 RX ORDER — PREGABALIN 225 MG/1
1000 CAPSULE ORAL EVERY 24 HOURS
Refills: 0 | Status: DISCONTINUED | OUTPATIENT
Start: 2023-04-22 | End: 2023-04-23

## 2023-04-22 RX ORDER — ASPIRIN/CALCIUM CARB/MAGNESIUM 324 MG
81 TABLET ORAL DAILY
Refills: 0 | Status: DISCONTINUED | OUTPATIENT
Start: 2023-04-22 | End: 2023-04-28

## 2023-04-22 RX ADMIN — Medication 1 MILLIGRAM(S): at 12:09

## 2023-04-22 RX ADMIN — ENOXAPARIN SODIUM 40 MILLIGRAM(S): 100 INJECTION SUBCUTANEOUS at 22:18

## 2023-04-22 RX ADMIN — CEFTRIAXONE 100 MILLIGRAM(S): 500 INJECTION, POWDER, FOR SOLUTION INTRAMUSCULAR; INTRAVENOUS at 22:18

## 2023-04-22 RX ADMIN — Medication 105 MILLIGRAM(S): at 03:29

## 2023-04-22 RX ADMIN — Medication 1 TABLET(S): at 12:09

## 2023-04-22 RX ADMIN — Medication 81 MILLIGRAM(S): at 18:39

## 2023-04-22 RX ADMIN — AMLODIPINE BESYLATE 5 MILLIGRAM(S): 2.5 TABLET ORAL at 06:52

## 2023-04-22 RX ADMIN — Medication 1 APPLICATION(S): at 18:39

## 2023-04-22 RX ADMIN — Medication 1 APPLICATION(S): at 06:52

## 2023-04-22 RX ADMIN — Medication 105 MILLIGRAM(S): at 12:09

## 2023-04-22 RX ADMIN — Medication 105 MILLIGRAM(S): at 18:39

## 2023-04-22 NOTE — PROGRESS NOTE ADULT - PROBLEM SELECTOR PLAN 4
Drinks 6-7 cans of beer "for as long as she can remember." As per patient, she has no recollection of ever withdrawing from alcohol; however, does not believe she has abstained for more than 3-4 days. Last drink was Tuesday AM. On exam, CIWA 0.   -CIWA protocol

## 2023-04-22 NOTE — PROGRESS NOTE ADULT - SUBJECTIVE AND OBJECTIVE BOX
OVERNIGHT EVENTS: No acute overnight events.    SUBJECTIVE:  Patient seen and examined at bedside. Reports no new complaints at this time.    Vital Signs Last 12 Hrs  T(F): 98.1 (04-22-23 @ 21:00), Max: 98.1 (04-22-23 @ 21:00)  HR: 68 (04-22-23 @ 21:00) (68 - 68)  BP: 122/72 (04-22-23 @ 21:00) (122/72 - 122/72)  BP(mean): --  RR: 18 (04-22-23 @ 21:00) (18 - 18)  SpO2: 98% (04-22-23 @ 21:00) (98% - 98%)  I&O's Summary      PHYSICAL EXAM:  Constitutional: NAD, comfortable in bed, eating.  HEENT: NC/AT, PERRLA, EOMI, no conjunctival pallor or scleral icterus, MMM  Neck: Supple  Respiratory: CTA B/L. No w/r/r.   Cardiovascular: RRR, normal S1 and S2, no m/r/g.   Gastrointestinal: soft NTND, no guarding or rebound tenderness, no palpable masses   Extremities: wwp; no cyanosis, clubbing or edema.   Vascular: Pulses equal and strong throughout.   Neurological: AAOx3, no focal deficits, strength and sensation intact throughout.       LABS:                        12.2   5.61  )-----------( 219      ( 22 Apr 2023 07:17 )             37.5     04-22    135  |  106  |  13  ----------------------------<  103<H>  4.0   |  21<L>  |  0.80    Ca    8.0<L>      22 Apr 2023 07:17  Phos  3.6     04-22  Mg     2.2     04-22    TPro  5.2<L>  /  Alb  2.5<L>  /  TBili  0.9  /  DBili  x   /  AST  29  /  ALT  22  /  AlkPhos  69  04-21            RADIOLOGY & ADDITIONAL TESTS:    MEDICATIONS  (STANDING):  amLODIPine   Tablet 5 milliGRAM(s) Oral daily  aspirin enteric coated 81 milliGRAM(s) Oral daily  cefTRIAXone   IVPB 1000 milliGRAM(s) IV Intermittent every 24 hours  clotrimazole 1% Cream 1 Application(s) Topical two times a day  cyanocobalamin 1000 MICROGram(s) Oral every 24 hours  enoxaparin Injectable 40 milliGRAM(s) SubCutaneous every 24 hours  folic acid 1 milliGRAM(s) Oral daily  multivitamin 1 Tablet(s) Oral daily  thiamine IVPB 500 milliGRAM(s) IV Intermittent every 8 hours    MEDICATIONS  (PRN):  acetaminophen     Tablet .. 650 milliGRAM(s) Oral every 6 hours PRN Temp greater or equal to 38C (100.4F), Mild Pain (1 - 3)

## 2023-04-22 NOTE — PROGRESS NOTE ADULT - PROBLEM SELECTOR PLAN 7
#Hyperbilirubinemia/elevated GGT-patient with slightly elevated BR, elevated GGT, and slight elevation in AST likely all secondary to Etoh.   - RUQ US without any acute process.

## 2023-04-22 NOTE — PROGRESS NOTE ADULT - PROBLEM SELECTOR PLAN 2
Patient with week-long history of weakness, likely 2/2 UTI. UA positive. Patient not endorsing urinary symptoms.   -follow urine cultures (>100k E coli)  -follow blood cultures  -continue with ctx 1gQD      #Fungal Rash-patient presented with erythemmatous rash under breast and in groin, likely 2/2 fungal etiology.  -continue with nystatin powder and clotrimazole cream.

## 2023-04-22 NOTE — PROGRESS NOTE ADULT - PROBLEM SELECTOR PLAN 6
#Hyponatremia-likely 2/2 hypotonic etiology given no po intake no fluids in past three days.  - Na improving with IVF  - RESOLVED

## 2023-04-22 NOTE — PROGRESS NOTE ADULT - PROBLEM SELECTOR PLAN 1
DDx includes alcohol neuropathy, B12 neuropathy, deconditioning.     - CT head negative for acute process  - f/u neuro recs  - MRI lumbar spine w/o IV contrast pending  - B12/Folate borderline/low  - thiamine 500mg IV TID for 5 days  - MV  - folate  - viit B12  - PT recs AJ

## 2023-04-22 NOTE — PROGRESS NOTE ADULT - PROBLEM SELECTOR PLAN 8
Patient with history of spine arthritis, reliant on walker.   -tylenol for pain  -lidocaine patch if needed.  -pt/ot as above

## 2023-04-23 DIAGNOSIS — I73.9 PERIPHERAL VASCULAR DISEASE, UNSPECIFIED: ICD-10-CM

## 2023-04-23 LAB
-  AMPICILLIN/SULBACTAM: SIGNIFICANT CHANGE UP
-  AMPICILLIN: SIGNIFICANT CHANGE UP
-  CEFAZOLIN: SIGNIFICANT CHANGE UP
-  CEFTRIAXONE: SIGNIFICANT CHANGE UP
-  CIPROFLOXACIN: SIGNIFICANT CHANGE UP
-  ERTAPENEM: SIGNIFICANT CHANGE UP
-  GENTAMICIN: SIGNIFICANT CHANGE UP
-  NITROFURANTOIN: SIGNIFICANT CHANGE UP
-  PIPERACILLIN/TAZOBACTAM: SIGNIFICANT CHANGE UP
-  TOBRAMYCIN: SIGNIFICANT CHANGE UP
-  TRIMETHOPRIM/SULFAMETHOXAZOLE: SIGNIFICANT CHANGE UP
ALBUMIN SERPL ELPH-MCNC: 2.4 G/DL — LOW (ref 3.3–5)
ALP SERPL-CCNC: 73 U/L — SIGNIFICANT CHANGE UP (ref 40–120)
ALT FLD-CCNC: 20 U/L — SIGNIFICANT CHANGE UP (ref 10–45)
ANION GAP SERPL CALC-SCNC: 8 MMOL/L — SIGNIFICANT CHANGE UP (ref 5–17)
ANISOCYTOSIS BLD QL: SIGNIFICANT CHANGE UP
AST SERPL-CCNC: 27 U/L — SIGNIFICANT CHANGE UP (ref 10–40)
BASOPHILS # BLD AUTO: 0 K/UL — SIGNIFICANT CHANGE UP (ref 0–0.2)
BASOPHILS NFR BLD AUTO: 0 % — SIGNIFICANT CHANGE UP (ref 0–2)
BILIRUB SERPL-MCNC: 0.5 MG/DL — SIGNIFICANT CHANGE UP (ref 0.2–1.2)
BUN SERPL-MCNC: 7 MG/DL — SIGNIFICANT CHANGE UP (ref 7–23)
CALCIUM SERPL-MCNC: 8.5 MG/DL — SIGNIFICANT CHANGE UP (ref 8.4–10.5)
CHLORIDE SERPL-SCNC: 105 MMOL/L — SIGNIFICANT CHANGE UP (ref 96–108)
CO2 SERPL-SCNC: 24 MMOL/L — SIGNIFICANT CHANGE UP (ref 22–31)
CREAT SERPL-MCNC: 0.69 MG/DL — SIGNIFICANT CHANGE UP (ref 0.5–1.3)
CRP SERPL-MCNC: 21.7 MG/L — HIGH (ref 0–4)
CULTURE RESULTS: SIGNIFICANT CHANGE UP
EGFR: 97 ML/MIN/1.73M2 — SIGNIFICANT CHANGE UP
EOSINOPHIL # BLD AUTO: 0.14 K/UL — SIGNIFICANT CHANGE UP (ref 0–0.5)
EOSINOPHIL NFR BLD AUTO: 2.7 % — SIGNIFICANT CHANGE UP (ref 0–6)
GLUCOSE SERPL-MCNC: 107 MG/DL — HIGH (ref 70–99)
HAV IGM SER-ACNC: SIGNIFICANT CHANGE UP
HBV CORE AB SER-ACNC: SIGNIFICANT CHANGE UP
HBV CORE IGM SER-ACNC: SIGNIFICANT CHANGE UP
HBV SURFACE AB SER-ACNC: SIGNIFICANT CHANGE UP
HBV SURFACE AG SER-ACNC: SIGNIFICANT CHANGE UP
HCT VFR BLD CALC: 34.6 % — SIGNIFICANT CHANGE UP (ref 34.5–45)
HCV AB S/CO SERPL IA: 0.1 S/CO — SIGNIFICANT CHANGE UP (ref 0–0.99)
HCV AB SERPL-IMP: SIGNIFICANT CHANGE UP
HCYS SERPL-MCNC: 19.7 UMOL/L — HIGH
HGB BLD-MCNC: 11.3 G/DL — LOW (ref 11.5–15.5)
LYMPHOCYTES # BLD AUTO: 2.04 K/UL — SIGNIFICANT CHANGE UP (ref 1–3.3)
LYMPHOCYTES # BLD AUTO: 39.1 % — SIGNIFICANT CHANGE UP (ref 13–44)
MACROCYTES BLD QL: SIGNIFICANT CHANGE UP
MAGNESIUM SERPL-MCNC: 2 MG/DL — SIGNIFICANT CHANGE UP (ref 1.6–2.6)
MANUAL SMEAR VERIFICATION: SIGNIFICANT CHANGE UP
MCHC RBC-ENTMCNC: 32.7 GM/DL — SIGNIFICANT CHANGE UP (ref 32–36)
MCHC RBC-ENTMCNC: 37.9 PG — HIGH (ref 27–34)
MCV RBC AUTO: 116.1 FL — HIGH (ref 80–100)
METHOD TYPE: SIGNIFICANT CHANGE UP
METHOD TYPE: SIGNIFICANT CHANGE UP
MONOCYTES # BLD AUTO: 0.47 K/UL — SIGNIFICANT CHANGE UP (ref 0–0.9)
MONOCYTES NFR BLD AUTO: 9.1 % — SIGNIFICANT CHANGE UP (ref 2–14)
NEUTROPHILS # BLD AUTO: 2.56 K/UL — SIGNIFICANT CHANGE UP (ref 1.8–7.4)
NEUTROPHILS NFR BLD AUTO: 49.1 % — SIGNIFICANT CHANGE UP (ref 43–77)
ORGANISM # SPEC MICROSCOPIC CNT: SIGNIFICANT CHANGE UP
PHOSPHATE SERPL-MCNC: 3.8 MG/DL — SIGNIFICANT CHANGE UP (ref 2.5–4.5)
PLAT MORPH BLD: NORMAL — SIGNIFICANT CHANGE UP
PLATELET # BLD AUTO: 231 K/UL — SIGNIFICANT CHANGE UP (ref 150–400)
POLYCHROMASIA BLD QL SMEAR: SLIGHT — SIGNIFICANT CHANGE UP
POTASSIUM SERPL-MCNC: 3.9 MMOL/L — SIGNIFICANT CHANGE UP (ref 3.5–5.3)
POTASSIUM SERPL-SCNC: 3.9 MMOL/L — SIGNIFICANT CHANGE UP (ref 3.5–5.3)
PROT SERPL-MCNC: 4.8 G/DL — LOW (ref 6–8.3)
PROT SERPL-MCNC: 4.8 G/DL — LOW (ref 6–8.3)
PROT SERPL-MCNC: 5.2 G/DL — LOW (ref 6–8.3)
RBC # BLD: 2.98 M/UL — LOW (ref 3.8–5.2)
RBC # FLD: 13.5 % — SIGNIFICANT CHANGE UP (ref 10.3–14.5)
RBC BLD AUTO: ABNORMAL
SMUDGE CELLS # BLD: PRESENT — SIGNIFICANT CHANGE UP
SODIUM SERPL-SCNC: 137 MMOL/L — SIGNIFICANT CHANGE UP (ref 135–145)
SPECIMEN SOURCE: SIGNIFICANT CHANGE UP
T4 AB SER-ACNC: 6.85 UG/DL — SIGNIFICANT CHANGE UP (ref 4.5–11.7)
WBC # BLD: 5.21 K/UL — SIGNIFICANT CHANGE UP (ref 3.8–10.5)
WBC # FLD AUTO: 5.21 K/UL — SIGNIFICANT CHANGE UP (ref 3.8–10.5)

## 2023-04-23 PROCEDURE — 99232 SBSQ HOSP IP/OBS MODERATE 35: CPT

## 2023-04-23 RX ORDER — FOLIC ACID 0.8 MG
5 TABLET ORAL ONCE
Refills: 0 | Status: COMPLETED | OUTPATIENT
Start: 2023-04-23 | End: 2023-04-23

## 2023-04-23 RX ORDER — PREGABALIN 225 MG/1
1000 CAPSULE ORAL ONCE
Refills: 0 | Status: COMPLETED | OUTPATIENT
Start: 2023-04-23 | End: 2023-04-23

## 2023-04-23 RX ORDER — PREGABALIN 225 MG/1
1000 CAPSULE ORAL DAILY
Refills: 0 | Status: DISCONTINUED | OUTPATIENT
Start: 2023-04-23 | End: 2023-04-23

## 2023-04-23 RX ORDER — FOLIC ACID 0.8 MG
5000 TABLET ORAL DAILY
Refills: 0 | Status: DISCONTINUED | OUTPATIENT
Start: 2023-04-23 | End: 2023-04-23

## 2023-04-23 RX ADMIN — Medication 1 APPLICATION(S): at 06:35

## 2023-04-23 RX ADMIN — PREGABALIN 1000 MICROGRAM(S): 225 CAPSULE ORAL at 06:40

## 2023-04-23 RX ADMIN — ENOXAPARIN SODIUM 40 MILLIGRAM(S): 100 INJECTION SUBCUTANEOUS at 22:32

## 2023-04-23 RX ADMIN — Medication 1 TABLET(S): at 13:12

## 2023-04-23 RX ADMIN — AMLODIPINE BESYLATE 5 MILLIGRAM(S): 2.5 TABLET ORAL at 06:35

## 2023-04-23 RX ADMIN — Medication 81 MILLIGRAM(S): at 13:17

## 2023-04-23 RX ADMIN — PREGABALIN 1000 MICROGRAM(S): 225 CAPSULE ORAL at 19:37

## 2023-04-23 RX ADMIN — Medication 105 MILLIGRAM(S): at 13:13

## 2023-04-23 RX ADMIN — Medication 105 MILLIGRAM(S): at 22:31

## 2023-04-23 RX ADMIN — Medication 1 MILLIGRAM(S): at 13:12

## 2023-04-23 RX ADMIN — Medication 5 MILLIGRAM(S): at 13:11

## 2023-04-23 RX ADMIN — Medication 1 APPLICATION(S): at 22:32

## 2023-04-23 RX ADMIN — CEFTRIAXONE 100 MILLIGRAM(S): 500 INJECTION, POWDER, FOR SOLUTION INTRAMUSCULAR; INTRAVENOUS at 22:31

## 2023-04-23 RX ADMIN — Medication 105 MILLIGRAM(S): at 06:35

## 2023-04-23 NOTE — PROGRESS NOTE ADULT - SUBJECTIVE AND OBJECTIVE BOX
NEUROLOGY CONSULT PROGRESS NOTE    INTERVAL HISTORY: No changes neurologically. Patient is stable. No concerns. No overnight events.    REVIEW OF SYSTEMS:  As per HPI, otherwise negative for Constitutional, Eyes, Ears/Nose/Mouth/Throat, Neck, Cardiovascular, Respiratory, Gastrointestinal, Genitourinary, Skin, Endocrine, Musculoskeletal, Psychiatric, and Hematologic/Lymphatic.    MEDICATIONS:  acetaminophen     Tablet .. 650 milliGRAM(s) Oral every 6 hours PRN  amLODIPine   Tablet 5 milliGRAM(s) Oral daily  aspirin enteric coated 81 milliGRAM(s) Oral daily  cefTRIAXone   IVPB 1000 milliGRAM(s) IV Intermittent every 24 hours  clotrimazole 1% Cream 1 Application(s) Topical two times a day  cyanocobalamin 1000 MICROGram(s) Oral every 24 hours  enoxaparin Injectable 40 milliGRAM(s) SubCutaneous every 24 hours  folic acid 1 milliGRAM(s) Oral daily  folic acid Injectable 5 milliGRAM(s) IV Push once  multivitamin 1 Tablet(s) Oral daily  thiamine IVPB 500 milliGRAM(s) IV Intermittent every 8 hours    VITAL SIGNS:  Vital Signs Last 24 Hrs  T(C): 37.3 (2023 05:45), Max: 37.3 (2023 05:45)  T(F): 99.1 (2023 05:45), Max: 99.1 (2023 05:45)  HR: 74 (2023 05:45) (67 - 74)  BP: 144/79 (2023 05:45) (122/72 - 144/79)  BP(mean): --  RR: 17 (2023 05:45) (16 - 18)  SpO2: 97% (2023 05:45) (97% - 98%)    Parameters below as of 2023 05:45  Patient On (Oxygen Delivery Method): room air    PHYSICAL EXAMINATION:  GEN: NAD, pleasant, cooperative  NEURO:   MENTAL STATUS: AAOx3  LANG/SPEECH: Fluent, intact naming, repetition & comprehension  CRANIAL NERVES:  II: Pupils equal and reactive, no RAPD, normal visual field and fundus  III, IV, VI: EOM intact, no gaze preference or deviation  V: normal  VII: no facial asymmetry  VIII: normal hearing to speech  MOTOR: 5/5 in both upper, LE L stronger than R. L hip 4+/5, Knee 4+/5, Plantar dorsi, Eversion, Flex, inversion 4+/5, RLE Hip 4-, Knee 4-, Plantar dorsi, Eversion, Flex, inversion 4-.   REFLEXES: 2/4 throughout, bilateral flexor plantars  SENSORY: Normal to touch, temperature & pin prick in all extremities except lateral aspect of R leg  to LT and temp, Vibration decreased slightly b/l below the ankles, proprioception intact.   COORD: Normal finger to nose however slow with hesitation. Possible slight dysmetria.  Gait: unwilling at this time.   L SLR +.     LABS:                          11.3   5.21  )-----------( 231      ( 2023 07:13 )             34.6     -    137  |  105  |  7   ----------------------------<  107<H>  3.9   |  24  |  0.69    Ca    8.5      2023 07:13  Phos  3.8       Mg     2.0         TPro  5.2<L>  /  Alb  2.4<L>  /  TBili  0.5  /  DBili  x   /  AST  27  /  ALT  20  /  AlkPhos  73            RADIOLOGY & ADDITIONAL STUDIES:      IMPRESSION & RECOMMENDATIONS:

## 2023-04-23 NOTE — PROGRESS NOTE ADULT - PROBLEM SELECTOR PLAN 6
#Hyponatremia-likely 2/2 hypotonic etiology given no po intake no fluids in past three days.  - Na improving with IVF  - RESOLVED -likely 2/2 etoh use.  b12, folate supplementation

## 2023-04-23 NOTE — PROGRESS NOTE ADULT - ASSESSMENT
Ms. Parsons is a 63 year old female with medical history significant for PAD and degenerative arthritis in her spine, former smoker (quit 3.2020) coming in from her home by EMS due to inability to care for herself at home, admitted for LE weakness.

## 2023-04-23 NOTE — DIETITIAN INITIAL EVALUATION ADULT - PROBLEM SELECTOR PLAN 4
-likely 2/2 etoh use.  -follow b12/folate    #Hyponatremia-likely 2/2 hypotonic etiology given no po intake no fluids in past three days.  -follow serum osm, urine studies  -repeat BMP in AM after 2L ivf    #Hyperbilirubinemia/elevated GGT-patient with slightly elevated BR, elevated GGT, and slight elevation in AST likely all secondary to Etoh. Given abd findings of TTp in RUQ, ddx includes liver pathology biliary pathology.  -follow RUQ US.  -follow up lipase

## 2023-04-23 NOTE — DIETITIAN INITIAL EVALUATION ADULT - NSFNSPHYEXAMSKINFT_GEN_A_CORE
Pressure Injury 1: sacrum, Suspected deep tissue injury  Pressure Injury 2: none, none  Pressure Injury 3: none, none  Pressure Injury 4: none, none  Pressure Injury 5: none, none  Pressure Injury 6: none, none  Pressure Injury 7: none, none  Pressure Injury 8: none, none  Pressure Injury 9: none, none  Pressure Injury 10: none, none  Pressure Injury 11: none, none, Pressure Injury 1: sacrum, Suspected deep tissue injury  Pressure Injury 2: none, none  Pressure Injury 3: none, none  Pressure Injury 4: none, none  Pressure Injury 5: none, none  Pressure Injury 6: none, none  Pressure Injury 7: none, none  Pressure Injury 8: none, none  Pressure Injury 9: none, none  Pressure Injury 10: none, none  Pressure Injury 11: none, none

## 2023-04-23 NOTE — PROGRESS NOTE ADULT - SUBJECTIVE AND OBJECTIVE BOX
OVERNIGHT EVENTS:     SUBJECTIVE:      Vital Signs Last 12 Hrs  T(F): 98.1 (04-22-23 @ 21:00), Max: 98.1 (04-22-23 @ 21:00)  HR: 68 (04-22-23 @ 21:00) (68 - 68)  BP: 122/72 (04-22-23 @ 21:00) (122/72 - 122/72)  BP(mean): --  RR: 18 (04-22-23 @ 21:00) (18 - 18)  SpO2: 98% (04-22-23 @ 21:00) (98% - 98%)  I&O's Summary      PHYSICAL EXAM:  Constitutional: NAD, comfortable in bed, eating.  HEENT: NC/AT, PERRLA, EOMI, no conjunctival pallor or scleral icterus, MMM  Neck: Supple  Respiratory: CTA B/L. No w/r/r.   Cardiovascular: RRR, normal S1 and S2, no m/r/g.   Gastrointestinal: soft NTND, no guarding or rebound tenderness, no palpable masses   Extremities: wwp; no cyanosis, clubbing or edema.   Vascular: Pulses equal and strong throughout.   Neurological: AAOx3, no focal deficits, strength and sensation intact throughout.       LABS:                        12.2   5.61  )-----------( 219      ( 22 Apr 2023 07:17 )             37.5     04-22    135  |  106  |  13  ----------------------------<  103<H>  4.0   |  21<L>  |  0.80    Ca    8.0<L>      22 Apr 2023 07:17  Phos  3.6     04-22  Mg     2.2     04-22    TPro  5.2<L>  /  Alb  2.5<L>  /  TBili  0.9  /  DBili  x   /  AST  29  /  ALT  22  /  AlkPhos  69  04-21            RADIOLOGY & ADDITIONAL TESTS:    MEDICATIONS  (STANDING):  amLODIPine   Tablet 5 milliGRAM(s) Oral daily  aspirin enteric coated 81 milliGRAM(s) Oral daily  cefTRIAXone   IVPB 1000 milliGRAM(s) IV Intermittent every 24 hours  clotrimazole 1% Cream 1 Application(s) Topical two times a day  cyanocobalamin 1000 MICROGram(s) Oral every 24 hours  enoxaparin Injectable 40 milliGRAM(s) SubCutaneous every 24 hours  folic acid 1 milliGRAM(s) Oral daily  multivitamin 1 Tablet(s) Oral daily  thiamine IVPB 500 milliGRAM(s) IV Intermittent every 8 hours    MEDICATIONS  (PRN):  acetaminophen     Tablet .. 650 milliGRAM(s) Oral every 6 hours PRN Temp greater or equal to 38C (100.4F), Mild Pain (1 - 3)   OVERNIGHT EVENTS: none    SUBJECTIVE:  no complaints, feels well, ros x 12 pts neg    Vital Signs Last 12 Hrs  T(F): 98.1 (04-22-23 @ 21:00), Max: 98.1 (04-22-23 @ 21:00)  HR: 68 (04-22-23 @ 21:00) (68 - 68)  BP: 122/72 (04-22-23 @ 21:00) (122/72 - 122/72)  BP(mean): --  RR: 18 (04-22-23 @ 21:00) (18 - 18)  SpO2: 98% (04-22-23 @ 21:00) (98% - 98%)  I&O's Summary      PHYSICAL EXAM:  Constitutional: NAD, comfortable in bed, eating.  HEENT: NC/AT, PERRLA, EOMI, no conjunctival pallor or scleral icterus, MMM  Neck: Supple  Respiratory: CTA B/L. No w/r/r.   Cardiovascular: RRR, normal S1 and S2, no m/r/g.   Gastrointestinal: soft NTND, no guarding or rebound tenderness, no palpable masses   Extremities: wwp; no cyanosis, clubbing or edema.   Vascular: Pulses equal and strong throughout.   Neurological: RLE 4-/5, LLE 4/5, RUE 4/5, LUE 4/5; AO3      LABS:                        12.2   5.61  )-----------( 219      ( 22 Apr 2023 07:17 )             37.5     04-22    135  |  106  |  13  ----------------------------<  103<H>  4.0   |  21<L>  |  0.80    Ca    8.0<L>      22 Apr 2023 07:17  Phos  3.6     04-22  Mg     2.2     04-22    TPro  5.2<L>  /  Alb  2.5<L>  /  TBili  0.9  /  DBili  x   /  AST  29  /  ALT  22  /  AlkPhos  69  04-21            RADIOLOGY & ADDITIONAL TESTS:    MEDICATIONS  (STANDING):  amLODIPine   Tablet 5 milliGRAM(s) Oral daily  aspirin enteric coated 81 milliGRAM(s) Oral daily  cefTRIAXone   IVPB 1000 milliGRAM(s) IV Intermittent every 24 hours  clotrimazole 1% Cream 1 Application(s) Topical two times a day  cyanocobalamin 1000 MICROGram(s) Oral every 24 hours  enoxaparin Injectable 40 milliGRAM(s) SubCutaneous every 24 hours  folic acid 1 milliGRAM(s) Oral daily  multivitamin 1 Tablet(s) Oral daily  thiamine IVPB 500 milliGRAM(s) IV Intermittent every 8 hours    MEDICATIONS  (PRN):  acetaminophen     Tablet .. 650 milliGRAM(s) Oral every 6 hours PRN Temp greater or equal to 38C (100.4F), Mild Pain (1 - 3)

## 2023-04-23 NOTE — PROGRESS NOTE ADULT - PROBLEM SELECTOR PLAN 4
Drinks 6-7 cans of beer "for as long as she can remember." As per patient, she has no recollection of ever withdrawing from alcohol; however, does not believe she has abstained for more than 3-4 days. Last drink was Tuesday AM. On exam, CIWA 0.   -CIWA protocol resume home aspirin  pt previously on statin but no longer taking - unclear why  outpt f/u

## 2023-04-23 NOTE — DIETITIAN INITIAL EVALUATION ADULT - PERTINENT LABORATORY DATA
04-23    137  |  105  |  7   ----------------------------<  107<H>  3.9   |  24  |  0.69    Ca    8.5      23 Apr 2023 07:13  Phos  3.8     04-23  Mg     2.0     04-23    TPro  5.2<L>  /  Alb  2.4<L>  /  TBili  0.5  /  DBili  x   /  AST  27  /  ALT  20  /  AlkPhos  73  04-23  A1C with Estimated Average Glucose Result: 4.8 % (04-21-23 @ 12:00)

## 2023-04-23 NOTE — PROGRESS NOTE ADULT - PROBLEM SELECTOR PLAN 1
DDx includes alcohol neuropathy, B12 neuropathy, deconditioning.     - CT head negative for acute process  - f/u neuro recs  - MRI lumbar spine w/o IV contrast pending  - B12/Folate borderline/low  - thiamine 500mg IV TID for 5 days  - MV  - folate  - viit B12  - PT recs AJ DDx includes alcohol neuropathy, B12 neuropathy, deconditioning.     -asymmetric - right leg slightly weaker than left leg (4- vs 4)  - CT head negative for acute process  - f/u neuro recs  - MRI lumbar spine w/o IV contrast pending  - B12/Folate borderline/low  - thiamine 500mg IV TID for 5 days  - MV  - folate  - viit B12  - PT recs AJ

## 2023-04-23 NOTE — PROGRESS NOTE ADULT - ASSESSMENT
63 year old female presented with new onset LE weakness started after being sitting on toilet from Monday PM to Thursday AM. She uses walker at baseline since about 10 years ago s/p RLE fibular surgery, she as was able to walk to bathroom w/o walker. Started experiencing b/l LE weakness after being moved from toilet seat. Currently neurological exam is noticeable for acute asymmetric LE weakness R>L with numbness over the common fibular nerve distribution. LLE SLR weakly positive as well. She also has PMHx of chronic EtOH which make her more prone to nerve injury. Folate 3.1, vitamine B12 304 (lower limit), homocysteine 19.1. CRP 21.7. Hb 11.3 . HbA1c and TSH normal.    Recommendations:   - Order vitamin E  - Supplement folate and vitamin B12. If possible initially IV or IM since currently patient has a megaloblastic anemia associated with low folate levels, borderline B12 and elevated homocysteinemia, with some neurological deficits that potentially could be related  - F/u MRI lumbar spine w/o  - PT/OT/PMNR  - Labs: HgbA1c, SPEP and UPEP with immunofixation, TSH and T4, JEANNETTE, ESR, CRP, VDRL/RPR,  c-ANCA, p-ANCA, Thiamin (B1), Methylmalonic acid and homocysteine, HCV and HBV.     Thank you for the courtesy of this consult, Please contact us if any neurological changes or questions, neurology team will continue to follow.

## 2023-04-23 NOTE — DIETITIAN INITIAL EVALUATION ADULT - PROBLEM SELECTOR PLAN 1
Patient with week-long history of weakness, likely 2/2 UTI. UA positive. Patient not endorsing urinary symptoms.   -follow urine cultures blood cultures  -continue with ctx 1gQD      #Fungal Rash-patient presented with erythemmatous rash under breast and in groin, likely 2/2 fungal etiology.  -continue with nystatin poiwder and clotrimazole cream.   -wound care

## 2023-04-23 NOTE — DIETITIAN NUTRITION RISK NOTIFICATION - ETIOLOGY-BASIS
Acute illness or injury Full range of motion of upper and lower extremities, no joint tenderness/swelling.

## 2023-04-23 NOTE — DIETITIAN INITIAL EVALUATION ADULT - PROBLEM SELECTOR PLAN 2
abdominal pain DDx includes infection as above versus deconditioning versus depression given  recently diagnosed with cancer.   -SW/CM consult   -B12/Folate/TSH  -Nutrition consult  -PT/OT  -strength 3/5 on exam, given history of spinal arthritis and acute weakness, MR thoracic and lumbar spine ordered.

## 2023-04-23 NOTE — PROGRESS NOTE ADULT - PROBLEM SELECTOR PLAN 8
Patient with history of spine arthritis, reliant on walker.   -tylenol for pain  -lidocaine patch if needed.  -pt/ot as above resolved

## 2023-04-23 NOTE — PROGRESS NOTE ADULT - PROBLEM SELECTOR PLAN 2
Patient with week-long history of weakness, likely 2/2 UTI. UA positive. Patient not endorsing urinary symptoms.   -follow urine cultures (>100k E coli)  -follow blood cultures  -continue with ctx 1gQD      #Fungal Rash-patient presented with erythemmatous rash under breast and in groin, likely 2/2 fungal etiology.  -continue with nystatin powder and clotrimazole cream. e coli sensitive to ctx  ctx x 3 days      #Fungal Rash-patient presented with erythemmatous rash under breast and in groin, likely 2/2 fungal etiology.  -continue with nystatin powder and clotrimazole cream.

## 2023-04-23 NOTE — PROGRESS NOTE ADULT - PROBLEM SELECTOR PLAN 7
#Hyperbilirubinemia/elevated GGT-patient with slightly elevated BR, elevated GGT, and slight elevation in AST likely all secondary to Etoh.   - RUQ US without any acute process. resolved

## 2023-04-23 NOTE — DIETITIAN INITIAL EVALUATION ADULT - PERTINENT MEDS FT
MEDICATIONS  (STANDING):  amLODIPine   Tablet 5 milliGRAM(s) Oral daily  aspirin enteric coated 81 milliGRAM(s) Oral daily  cefTRIAXone   IVPB 1000 milliGRAM(s) IV Intermittent every 24 hours  clotrimazole 1% Cream 1 Application(s) Topical two times a day  cyanocobalamin 1000 MICROGram(s) Oral every 24 hours  enoxaparin Injectable 40 milliGRAM(s) SubCutaneous every 24 hours  folic acid 1 milliGRAM(s) Oral daily  folic acid Injectable 5 milliGRAM(s) IV Push once  multivitamin 1 Tablet(s) Oral daily  thiamine IVPB 500 milliGRAM(s) IV Intermittent every 8 hours    MEDICATIONS  (PRN):  acetaminophen     Tablet .. 650 milliGRAM(s) Oral every 6 hours PRN Temp greater or equal to 38C (100.4F), Mild Pain (1 - 3)

## 2023-04-24 LAB
ANION GAP SERPL CALC-SCNC: 6 MMOL/L — SIGNIFICANT CHANGE UP (ref 5–17)
ANION GAP SERPL CALC-SCNC: 9 MMOL/L — SIGNIFICANT CHANGE UP (ref 5–17)
ANISOCYTOSIS BLD QL: SIGNIFICANT CHANGE UP
BASOPHILS # BLD AUTO: 0.04 K/UL — SIGNIFICANT CHANGE UP (ref 0–0.2)
BASOPHILS NFR BLD AUTO: 0.9 % — SIGNIFICANT CHANGE UP (ref 0–2)
BUN SERPL-MCNC: 5 MG/DL — LOW (ref 7–23)
BUN SERPL-MCNC: 5 MG/DL — LOW (ref 7–23)
CALCIUM SERPL-MCNC: 7.9 MG/DL — LOW (ref 8.4–10.5)
CALCIUM SERPL-MCNC: 8.9 MG/DL — SIGNIFICANT CHANGE UP (ref 8.4–10.5)
CHLORIDE SERPL-SCNC: 104 MMOL/L — SIGNIFICANT CHANGE UP (ref 96–108)
CHLORIDE SERPL-SCNC: 105 MMOL/L — SIGNIFICANT CHANGE UP (ref 96–108)
CO2 SERPL-SCNC: 22 MMOL/L — SIGNIFICANT CHANGE UP (ref 22–31)
CO2 SERPL-SCNC: 23 MMOL/L — SIGNIFICANT CHANGE UP (ref 22–31)
CREAT ?TM UR-MCNC: 87 MG/DL — SIGNIFICANT CHANGE UP
CREAT SERPL-MCNC: 0.62 MG/DL — SIGNIFICANT CHANGE UP (ref 0.5–1.3)
CREAT SERPL-MCNC: 0.7 MG/DL — SIGNIFICANT CHANGE UP (ref 0.5–1.3)
DACRYOCYTES BLD QL SMEAR: SLIGHT — SIGNIFICANT CHANGE UP
EGFR: 97 ML/MIN/1.73M2 — SIGNIFICANT CHANGE UP
EGFR: 99 ML/MIN/1.73M2 — SIGNIFICANT CHANGE UP
EOSINOPHIL # BLD AUTO: 0.12 K/UL — SIGNIFICANT CHANGE UP (ref 0–0.5)
EOSINOPHIL NFR BLD AUTO: 2.6 % — SIGNIFICANT CHANGE UP (ref 0–6)
GIANT PLATELETS BLD QL SMEAR: PRESENT — SIGNIFICANT CHANGE UP
GLUCOSE SERPL-MCNC: 109 MG/DL — HIGH (ref 70–99)
GLUCOSE SERPL-MCNC: 126 MG/DL — HIGH (ref 70–99)
HCT VFR BLD CALC: 35.2 % — SIGNIFICANT CHANGE UP (ref 34.5–45)
HGB BLD-MCNC: 11.5 G/DL — SIGNIFICANT CHANGE UP (ref 11.5–15.5)
LYMPHOCYTES # BLD AUTO: 1.65 K/UL — SIGNIFICANT CHANGE UP (ref 1–3.3)
LYMPHOCYTES # BLD AUTO: 35.1 % — SIGNIFICANT CHANGE UP (ref 13–44)
MACROCYTES BLD QL: SIGNIFICANT CHANGE UP
MAGNESIUM SERPL-MCNC: 1.8 MG/DL — SIGNIFICANT CHANGE UP (ref 1.6–2.6)
MANUAL SMEAR VERIFICATION: SIGNIFICANT CHANGE UP
MCHC RBC-ENTMCNC: 32.7 GM/DL — SIGNIFICANT CHANGE UP (ref 32–36)
MCHC RBC-ENTMCNC: 37.6 PG — HIGH (ref 27–34)
MCV RBC AUTO: 115 FL — HIGH (ref 80–100)
MICROCYTES BLD QL: SLIGHT — SIGNIFICANT CHANGE UP
MONOCYTES # BLD AUTO: 0.21 K/UL — SIGNIFICANT CHANGE UP (ref 0–0.9)
MONOCYTES NFR BLD AUTO: 4.4 % — SIGNIFICANT CHANGE UP (ref 2–14)
MYELOCYTES NFR BLD: 0.9 % — HIGH (ref 0–0)
NEUTROPHILS # BLD AUTO: 2.64 K/UL — SIGNIFICANT CHANGE UP (ref 1.8–7.4)
NEUTROPHILS NFR BLD AUTO: 56.1 % — SIGNIFICANT CHANGE UP (ref 43–77)
OSMOLALITY UR: 494 MOSM/KG — SIGNIFICANT CHANGE UP (ref 300–900)
OVALOCYTES BLD QL SMEAR: SLIGHT — SIGNIFICANT CHANGE UP
PHOSPHATE SERPL-MCNC: 3.9 MG/DL — SIGNIFICANT CHANGE UP (ref 2.5–4.5)
PLAT MORPH BLD: ABNORMAL
PLATELET # BLD AUTO: 241 K/UL — SIGNIFICANT CHANGE UP (ref 150–400)
POIKILOCYTOSIS BLD QL AUTO: SLIGHT — SIGNIFICANT CHANGE UP
POLYCHROMASIA BLD QL SMEAR: SLIGHT — SIGNIFICANT CHANGE UP
POTASSIUM SERPL-MCNC: 3.9 MMOL/L — SIGNIFICANT CHANGE UP (ref 3.5–5.3)
POTASSIUM SERPL-MCNC: SIGNIFICANT CHANGE UP MMOL/L (ref 3.5–5.3)
POTASSIUM SERPL-SCNC: 3.9 MMOL/L — SIGNIFICANT CHANGE UP (ref 3.5–5.3)
POTASSIUM SERPL-SCNC: SIGNIFICANT CHANGE UP MMOL/L (ref 3.5–5.3)
RBC # BLD: 3.06 M/UL — LOW (ref 3.8–5.2)
RBC # FLD: 13.5 % — SIGNIFICANT CHANGE UP (ref 10.3–14.5)
RBC BLD AUTO: ABNORMAL
SMUDGE CELLS # BLD: PRESENT — SIGNIFICANT CHANGE UP
SODIUM SERPL-SCNC: 133 MMOL/L — LOW (ref 135–145)
SODIUM SERPL-SCNC: 136 MMOL/L — SIGNIFICANT CHANGE UP (ref 135–145)
SODIUM UR-SCNC: 112 MMOL/L — SIGNIFICANT CHANGE UP
SPHEROCYTES BLD QL SMEAR: SLIGHT — SIGNIFICANT CHANGE UP
T PALLIDUM AB TITR SER: NEGATIVE — SIGNIFICANT CHANGE UP
WBC # BLD: 4.71 K/UL — SIGNIFICANT CHANGE UP (ref 3.8–10.5)
WBC # FLD AUTO: 4.71 K/UL — SIGNIFICANT CHANGE UP (ref 3.8–10.5)

## 2023-04-24 PROCEDURE — 99232 SBSQ HOSP IP/OBS MODERATE 35: CPT

## 2023-04-24 RX ORDER — POLYETHYLENE GLYCOL 3350 17 G/17G
17 POWDER, FOR SOLUTION ORAL DAILY
Refills: 0 | Status: DISCONTINUED | OUTPATIENT
Start: 2023-04-24 | End: 2023-04-28

## 2023-04-24 RX ORDER — SENNA PLUS 8.6 MG/1
2 TABLET ORAL AT BEDTIME
Refills: 0 | Status: DISCONTINUED | OUTPATIENT
Start: 2023-04-24 | End: 2023-04-28

## 2023-04-24 RX ADMIN — AMLODIPINE BESYLATE 5 MILLIGRAM(S): 2.5 TABLET ORAL at 06:42

## 2023-04-24 RX ADMIN — Medication 105 MILLIGRAM(S): at 21:57

## 2023-04-24 RX ADMIN — Medication 1 APPLICATION(S): at 06:42

## 2023-04-24 RX ADMIN — CEFTRIAXONE 100 MILLIGRAM(S): 500 INJECTION, POWDER, FOR SOLUTION INTRAMUSCULAR; INTRAVENOUS at 23:32

## 2023-04-24 RX ADMIN — Medication 105 MILLIGRAM(S): at 05:00

## 2023-04-24 RX ADMIN — Medication 1 APPLICATION(S): at 18:24

## 2023-04-24 RX ADMIN — Medication 1 TABLET(S): at 12:12

## 2023-04-24 RX ADMIN — ENOXAPARIN SODIUM 40 MILLIGRAM(S): 100 INJECTION SUBCUTANEOUS at 23:33

## 2023-04-24 RX ADMIN — Medication 105 MILLIGRAM(S): at 13:40

## 2023-04-24 RX ADMIN — Medication 1 MILLIGRAM(S): at 12:13

## 2023-04-24 RX ADMIN — POLYETHYLENE GLYCOL 3350 17 GRAM(S): 17 POWDER, FOR SOLUTION ORAL at 12:13

## 2023-04-24 RX ADMIN — Medication 81 MILLIGRAM(S): at 12:13

## 2023-04-24 RX ADMIN — SENNA PLUS 2 TABLET(S): 8.6 TABLET ORAL at 23:33

## 2023-04-24 NOTE — PROGRESS NOTE ADULT - PROBLEM SELECTOR PLAN 1
DDx includes alcohol neuropathy, B12 neuropathy, deconditioning.     - PT recommending AJ   - symmetric - right leg slightly weaker than left leg (4- vs 4)  - CT head negative for acute process  - MRI lumbar spine w/o IV contrast pending  - B12/Folate borderline/low -  will receive another dose of IM B12 today  - thiamine 500mg IV TID for 5 days  - MV  - folate  - viit B12  - PT recs AJ

## 2023-04-24 NOTE — PROGRESS NOTE ADULT - SUBJECTIVE AND OBJECTIVE BOX
O/N Events: naeo    Subjective/ROS: Patient seen and examined at bedside, complaints of abdominal tenderness notes she has not had a bowel movement in 2+ days, otherwise she denies fevers, chills, HA, CP, SOB, n/v, changes in urinary habits. 12pt ROS otherwise negative.    VITALS  Vital Signs Last 24 Hrs  T(C): 36.4 (24 Apr 2023 12:10), Max: 37 (24 Apr 2023 05:55)  T(F): 97.6 (24 Apr 2023 12:10), Max: 98.6 (24 Apr 2023 05:55)  HR: 75 (24 Apr 2023 12:10) (75 - 84)  BP: 112/75 (24 Apr 2023 12:10) (112/75 - 148/74)  BP(mean): --  RR: 16 (24 Apr 2023 12:10) (16 - 18)  SpO2: 99% (24 Apr 2023 12:10) (96% - 99%)    Parameters below as of 24 Apr 2023 12:10  Patient On (Oxygen Delivery Method): room air        CAPILLARY BLOOD GLUCOSE    PHYSICAL EXAM:  Constitutional: NAD, comfortable in bed, eating  HEENT: NC/AT, PERRLA, EOMI, no conjunctival pallor or scleral icterus, MMM  Neck: Supple  Respiratory: CTA B/L. No w/r/r.   Cardiovascular: RRR, normal S1 and S2, no m/r/g.   Gastrointestinal: soft NTND, no guarding or rebound tenderness, no palpable masses. Candidal rash in under breasts bilaterally   Extremities: wwp; no cyanosis, clubbing or edema.   Vascular: Pulses equal and strong throughout.       MEDICATIONS  (STANDING):  amLODIPine   Tablet 5 milliGRAM(s) Oral daily  aspirin enteric coated 81 milliGRAM(s) Oral daily  cefTRIAXone   IVPB 1000 milliGRAM(s) IV Intermittent every 24 hours  clotrimazole 1% Cream 1 Application(s) Topical two times a day  enoxaparin Injectable 40 milliGRAM(s) SubCutaneous every 24 hours  folic acid 1 milliGRAM(s) Oral daily  multivitamin 1 Tablet(s) Oral daily  polyethylene glycol 3350 17 Gram(s) Oral daily  thiamine IVPB 500 milliGRAM(s) IV Intermittent every 8 hours    MEDICATIONS  (PRN):  acetaminophen     Tablet .. 650 milliGRAM(s) Oral every 6 hours PRN Temp greater or equal to 38C (100.4F), Mild Pain (1 - 3)      No Known Allergies      LABS                        11.5   4.71  )-----------( 241      ( 24 Apr 2023 05:30 )             35.2     04-24    136  |  104  |  5<L>  ----------------------------<  126<H>  3.9   |  23  |  0.70    Ca    8.9      24 Apr 2023 12:00  Phos  3.9     04-24  Mg     1.8     04-24    TPro  5.2<L>  /  Alb  2.4<L>  /  TBili  0.5  /  DBili  x   /  AST  27  /  ALT  20  /  AlkPhos  73  04-23                IMAGING/EKG/ETC

## 2023-04-24 NOTE — PROGRESS NOTE ADULT - SUBJECTIVE AND OBJECTIVE BOX
Neurology Progress Note    Interval History:    Patient was seen and examined, no acute event over night. She reports of overall improvement of her LE weakness as she was able to walk to the bathroom with walker and also move to chair from bed.     Medications:  acetaminophen     Tablet .. 650 milliGRAM(s) Oral every 6 hours PRN  amLODIPine   Tablet 5 milliGRAM(s) Oral daily  aspirin enteric coated 81 milliGRAM(s) Oral daily  cefTRIAXone   IVPB 1000 milliGRAM(s) IV Intermittent every 24 hours  clotrimazole 1% Cream 1 Application(s) Topical two times a day  enoxaparin Injectable 40 milliGRAM(s) SubCutaneous every 24 hours  folic acid 1 milliGRAM(s) Oral daily  multivitamin 1 Tablet(s) Oral daily  polyethylene glycol 3350 17 Gram(s) Oral daily  thiamine IVPB 500 milliGRAM(s) IV Intermittent every 8 hours      Vital Signs Last 24 Hrs  T(C): 36.4 (2023 12:10), Max: 37 (2023 05:55)  T(F): 97.6 (2023 12:10), Max: 98.6 (2023 05:55)  HR: 75 (2023 12:10) (75 - 84)  BP: 112/75 (2023 12:10) (112/75 - 148/74)  BP(mean): --  RR: 16 (2023 12:10) (16 - 18)  SpO2: 99% (2023 12:10) (96% - 99%)    Parameters below as of 2023 12:10  Patient On (Oxygen Delivery Method): room air        PHYSICAL EXAMINATION:  GEN: NAD, pleasant, cooperative  NEURO:   MENTAL STATUS: AAOx3  LANG/SPEECH: Fluent, intact naming, repetition & comprehension  CRANIAL NERVES:  II: Pupils equal and reactive, no RAPD, normal visual field and fundus  III, IV, VI: EOM intact, no gaze preference or deviation  V: normal  VII: no facial asymmetry  VIII: normal hearing to speech  MOTOR: 5/5 in both upper, LE L stronger than R. L hip 4+/5, Knee 4+/5, Plantar dorsi, Eversion, Flex, inversion 4+/5, RLE Hip 4-, Knee 4-, Plantar dorsi, Eversion, Flex, inversion 5/5.   REFLEXES: 2/4 throughout, bilateral flexor plantars, patellar 3+ b/l.   SENSORY: Normal to touch, temperature & pin prick in all extremities except lateral aspect of R leg  to LT and temp, Vibration decreased slightly b/l below the ankles, proprioception intact.   COORD: Normal finger to nose however slow with hesitation. Possible slight dysmetria.  Gait: able to walk with walker.     Labs:  CBC Full  -  ( 2023 05:30 )  WBC Count : 4.71 K/uL  RBC Count : 3.06 M/uL  Hemoglobin : 11.5 g/dL  Hematocrit : 35.2 %  Platelet Count - Automated : 241 K/uL  Mean Cell Volume : 115.0 fl  Mean Cell Hemoglobin : 37.6 pg  Mean Cell Hemoglobin Concentration : 32.7 gm/dL  Auto Neutrophil # : 2.64 K/uL  Auto Lymphocyte # : 1.65 K/uL  Auto Monocyte # : 0.21 K/uL  Auto Eosinophil # : 0.12 K/uL  Auto Basophil # : 0.04 K/uL  Auto Neutrophil % : 56.1 %  Auto Lymphocyte % : 35.1 %  Auto Monocyte % : 4.4 %  Auto Eosinophil % : 2.6 %  Auto Basophil % : 0.9 %    04-24    133<L>  |  105  |  5<L>  ----------------------------<  109<H>  See note   |  22  |  0.62    Ca    7.9<L>      2023 05:30  Phos  3.9     04-24  Mg     1.8     04-24    TPro  5.2<L>  /  Alb  2.4<L>  /  TBili  0.5  /  DBili  x   /  AST  27  /  ALT  20  /  AlkPhos  73  04-23    LIVER FUNCTIONS - ( 2023 07:13 )  Alb: 2.4 g/dL / Pro: 5.2 g/dL / ALK PHOS: 73 U/L / ALT: 20 U/L / AST: 27 U/L / GGT: x

## 2023-04-24 NOTE — PROGRESS NOTE ADULT - ASSESSMENT
63 year old female presented with new onset LE weakness started after being sitting on toilet from Monday PM to Thursday AM. She uses walker at baseline since about 10 years ago s/p RLE fibular surgery, she as was able to walk to bathroom w/o walker. Started experiencing b/l LE weakness after being moved from toilet seat. Currently neurological exam is noticeable for acute asymmetric LE weakness R>L with numbness over the common fibular nerve distribution. LLE SLR weakly positive as well. She also has PMHx of chronic EtOH which make her more prone to nerve injury. Folate 3.1, vitamine B12 304 (lower limit), homocysteine 19.1. CRP 21.7. Hb 11.3 . HbA1c and TSH normal.    Recommendations:   - Continue with Supplement folate and vitamin B12. - F/u MRI lumbar spine w/o  - PT/OT/PMNR  - Follow up with Lumbar spine MRI.     Thank you for the courtesy of this consult, Please contact us if any neurological changes or questions, neurology team will continue to follow.

## 2023-04-24 NOTE — PROGRESS NOTE ADULT - PROBLEM SELECTOR PLAN 5
Drinks 6-7 cans of beer "for as long as she can remember." As per patient, she has no recollection of ever withdrawing from alcohol; however, does not believe she has abstained for more than 3-4 days. Last drink was Tuesday AM. On exam, CIWA 0.     -CIWA protocol  - has not required ativan for CIWA >8

## 2023-04-24 NOTE — SBIRT NOTE ADULT - NSSBIRTBRIEFINTDET_GEN_A_CORE
Patient endorses drinking approximately 6 cans of beer daily. Patient reports no hx of blackouts or ETOH "overuse." SW provided brief intervention on ETOH use. SW offered to provide resources; patient declining resources at this time.

## 2023-04-24 NOTE — PROGRESS NOTE ADULT - PROBLEM SELECTOR PLAN 2
e coli sensitive to CTX. Now on day 3 of antibiotics, no symtpoms.       #Fungal Rash-patient presented with erythemmatous rash under breast and in groin, likely 2/2 fungal etiology.  -continue with nystatin powder and clotrimazole cream.

## 2023-04-24 NOTE — PROGRESS NOTE ADULT - ASSESSMENT
Ms. Parsons is a 63 year old female with medical history significant for PAD and degenerative arthritis in her spine, former smoker (quit 3.2020) coming in from her home by EMS due to inability to care for herself at home, admitted for LE weakness and UTI.

## 2023-04-25 LAB
ANION GAP SERPL CALC-SCNC: 9 MMOL/L — SIGNIFICANT CHANGE UP (ref 5–17)
BUN SERPL-MCNC: 6 MG/DL — LOW (ref 7–23)
CALCIUM SERPL-MCNC: 8.2 MG/DL — LOW (ref 8.4–10.5)
CHLORIDE SERPL-SCNC: 104 MMOL/L — SIGNIFICANT CHANGE UP (ref 96–108)
CO2 SERPL-SCNC: 24 MMOL/L — SIGNIFICANT CHANGE UP (ref 22–31)
CREAT SERPL-MCNC: 0.63 MG/DL — SIGNIFICANT CHANGE UP (ref 0.5–1.3)
EGFR: 99 ML/MIN/1.73M2 — SIGNIFICANT CHANGE UP
GLUCOSE SERPL-MCNC: 98 MG/DL — SIGNIFICANT CHANGE UP (ref 70–99)
HCT VFR BLD CALC: 33.6 % — LOW (ref 34.5–45)
HGB BLD-MCNC: 11.1 G/DL — LOW (ref 11.5–15.5)
MAGNESIUM SERPL-MCNC: 1.7 MG/DL — SIGNIFICANT CHANGE UP (ref 1.6–2.6)
MCHC RBC-ENTMCNC: 33 GM/DL — SIGNIFICANT CHANGE UP (ref 32–36)
MCHC RBC-ENTMCNC: 38.1 PG — HIGH (ref 27–34)
MCV RBC AUTO: 115.5 FL — HIGH (ref 80–100)
NRBC # BLD: 0 /100 WBCS — SIGNIFICANT CHANGE UP (ref 0–0)
PHOSPHATE SERPL-MCNC: 3.6 MG/DL — SIGNIFICANT CHANGE UP (ref 2.5–4.5)
PLATELET # BLD AUTO: 240 K/UL — SIGNIFICANT CHANGE UP (ref 150–400)
POTASSIUM SERPL-MCNC: 3.8 MMOL/L — SIGNIFICANT CHANGE UP (ref 3.5–5.3)
POTASSIUM SERPL-SCNC: 3.8 MMOL/L — SIGNIFICANT CHANGE UP (ref 3.5–5.3)
PROT ?TM UR-MCNC: 10 MG/DL — SIGNIFICANT CHANGE UP (ref 0–12)
PROT ?TM UR-MCNC: 10 MG/DL — SIGNIFICANT CHANGE UP (ref 0–12)
RBC # BLD: 2.91 M/UL — LOW (ref 3.8–5.2)
RBC # FLD: 13.2 % — SIGNIFICANT CHANGE UP (ref 10.3–14.5)
SODIUM SERPL-SCNC: 137 MMOL/L — SIGNIFICANT CHANGE UP (ref 135–145)
WBC # BLD: 4.65 K/UL — SIGNIFICANT CHANGE UP (ref 3.8–10.5)
WBC # FLD AUTO: 4.65 K/UL — SIGNIFICANT CHANGE UP (ref 3.8–10.5)

## 2023-04-25 PROCEDURE — 99231 SBSQ HOSP IP/OBS SF/LOW 25: CPT

## 2023-04-25 PROCEDURE — 72148 MRI LUMBAR SPINE W/O DYE: CPT | Mod: 26

## 2023-04-25 RX ORDER — PREGABALIN 225 MG/1
1000 CAPSULE ORAL ONCE
Refills: 0 | Status: COMPLETED | OUTPATIENT
Start: 2023-04-25 | End: 2023-04-25

## 2023-04-25 RX ORDER — SIMETHICONE 80 MG/1
80 TABLET, CHEWABLE ORAL THREE TIMES A DAY
Refills: 0 | Status: DISCONTINUED | OUTPATIENT
Start: 2023-04-25 | End: 2023-04-27

## 2023-04-25 RX ADMIN — Medication 1 APPLICATION(S): at 06:55

## 2023-04-25 RX ADMIN — SENNA PLUS 2 TABLET(S): 8.6 TABLET ORAL at 22:59

## 2023-04-25 RX ADMIN — ENOXAPARIN SODIUM 40 MILLIGRAM(S): 100 INJECTION SUBCUTANEOUS at 22:59

## 2023-04-25 RX ADMIN — Medication 105 MILLIGRAM(S): at 22:59

## 2023-04-25 RX ADMIN — SIMETHICONE 80 MILLIGRAM(S): 80 TABLET, CHEWABLE ORAL at 22:58

## 2023-04-25 RX ADMIN — Medication 1 TABLET(S): at 12:43

## 2023-04-25 RX ADMIN — Medication 81 MILLIGRAM(S): at 12:43

## 2023-04-25 RX ADMIN — AMLODIPINE BESYLATE 5 MILLIGRAM(S): 2.5 TABLET ORAL at 06:55

## 2023-04-25 RX ADMIN — Medication 105 MILLIGRAM(S): at 06:55

## 2023-04-25 RX ADMIN — PREGABALIN 1000 MICROGRAM(S): 225 CAPSULE ORAL at 12:43

## 2023-04-25 RX ADMIN — SIMETHICONE 80 MILLIGRAM(S): 80 TABLET, CHEWABLE ORAL at 15:42

## 2023-04-25 RX ADMIN — Medication 105 MILLIGRAM(S): at 13:43

## 2023-04-25 RX ADMIN — Medication 1 MILLIGRAM(S): at 12:42

## 2023-04-25 RX ADMIN — Medication 1 APPLICATION(S): at 17:30

## 2023-04-25 NOTE — PROGRESS NOTE ADULT - PROBLEM SELECTOR PLAN 5
Drinks 6-7 cans of beer "for as long as she can remember." As per patient, she has no recollection of ever withdrawing from alcohol; however, does not believe she has abstained for more than 3-4 days. Last drink was Tuesday AM.    NTD other than  on alcohol cessation. Did not require Ativan throughout first days of hospitalization.

## 2023-04-25 NOTE — PROGRESS NOTE ADULT - PROBLEM SELECTOR PLAN 2
RESOLVED.  e coli sensitive to CTX, s/p day 3. with resulotion of symptoms.       #Fungal Rash-patient presented with erythemmatous rash under breast and in groin, likely 2/2 fungal etiology.  -continue with nystatin powder and clotrimazole cream.

## 2023-04-25 NOTE — PROGRESS NOTE ADULT - PROBLEM SELECTOR PLAN 1
DDx includes alcohol neuropathy, B12 neuropathy, deconditioning.     - PT recommending AJ, to work with again today  - CT head negative for acute process  - MRI lumbar spine w/o IV contrast pending  - B12/Folate borderline/low -  will receive another dose of IM B12 today  - thiamine 500mg IV TID for 5 days  - MV  - folate  - viit B12  - PT recs AJ DDx includes alcohol neuropathy, B12 neuropathy, deconditioning.     - PT recommending AJ, to work with again today  - CT head negative for acute process  - MRI lumbar spine w/o IV contrast pending  - B12/Folate borderline/low -  will receive another dose of IM B12 today  - s/p thiamine 500mg IV TID for 5 days  - folate  - PT recs AJ

## 2023-04-25 NOTE — PROGRESS NOTE ADULT - PROBLEM SELECTOR PLAN 6
-likely 2/2 etoh use.      Plan:   - b12   - folate supplementation  - thiamine PO -likely 2/2 etoh use.      Plan:   - b12   - folate supplementation

## 2023-04-25 NOTE — PROGRESS NOTE ADULT - SUBJECTIVE AND OBJECTIVE BOX
O/N Events: naeo    Subjective/ROS: Patient seen and examined at bedside, no complaints this morning other than slight stomach pain notes she had a big dinner last night and feels like she has to pass gas. Otherwise, she denies fevers, chills, HA, CP, SOB, n/v, changes in bowel/urinary habits. 12pt ROS otherwise negative.    VITALS  Vital Signs Last 24 Hrs  T(C): 36.4 (25 Apr 2023 12:33), Max: 37.1 (25 Apr 2023 05:35)  T(F): 97.6 (25 Apr 2023 12:33), Max: 98.8 (25 Apr 2023 05:35)  HR: 74 (25 Apr 2023 12:33) (74 - 76)  BP: 110/68 (25 Apr 2023 12:33) (110/68 - 139/66)  BP(mean): --  RR: 18 (25 Apr 2023 12:33) (16 - 18)  SpO2: 99% (25 Apr 2023 12:33) (97% - 99%)    Parameters below as of 25 Apr 2023 12:33  Patient On (Oxygen Delivery Method): room air        CAPILLARY BLOOD GLUCOSE          PHYSICAL EXAM  General: NAD, pleasant  Head: NC/AT; MMM; PERRL; EOMI w/ conjugate gaze deviation of L eye   Neck: Supple; no JVD  Respiratory: CTAB; no wheezes/rales/rhonchi  Cardiovascular: Regular rhythm/rate; S1/S2+, no murmurs, rubs gallops   Gastrointestinal: Soft; slightly distended and tender + tympanitic; bowel sounds normal and present  Extremities: WWP; no edema/cyanosis  Neurological: A&Ox3, no obvious focal deficits    MEDICATIONS  (STANDING):  amLODIPine   Tablet 5 milliGRAM(s) Oral daily  aspirin enteric coated 81 milliGRAM(s) Oral daily  bisacodyl Suppository 10 milliGRAM(s) Rectal daily  clotrimazole 1% Cream 1 Application(s) Topical two times a day  enoxaparin Injectable 40 milliGRAM(s) SubCutaneous every 24 hours  folic acid 1 milliGRAM(s) Oral daily  multivitamin 1 Tablet(s) Oral daily  polyethylene glycol 3350 17 Gram(s) Oral daily  senna 2 Tablet(s) Oral at bedtime  simethicone 80 milliGRAM(s) Chew three times a day  thiamine IVPB 500 milliGRAM(s) IV Intermittent every 8 hours    MEDICATIONS  (PRN):  acetaminophen     Tablet .. 650 milliGRAM(s) Oral every 6 hours PRN Temp greater or equal to 38C (100.4F), Mild Pain (1 - 3)      No Known Allergies      LABS                        11.1   4.65  )-----------( 240      ( 25 Apr 2023 07:36 )             33.6     04-25    137  |  104  |  6<L>  ----------------------------<  98  3.8   |  24  |  0.63    Ca    8.2<L>      25 Apr 2023 07:36  Phos  3.6     04-25  Mg     1.7     04-25                  IMAGING/EKG/ETC

## 2023-04-26 LAB
% ALBUMIN: 47.1 % — SIGNIFICANT CHANGE UP
% ALPHA 1: 7.3 % — SIGNIFICANT CHANGE UP
% ALPHA 2: 13.4 % — SIGNIFICANT CHANGE UP
% BETA: 15.7 % — SIGNIFICANT CHANGE UP
% GAMMA: 16.5 % — SIGNIFICANT CHANGE UP
ALBUMIN SERPL ELPH-MCNC: 2.3 G/DL — LOW (ref 3.6–5.5)
ALBUMIN/GLOB SERPL ELPH: 0.9 RATIO — SIGNIFICANT CHANGE UP
ALPHA1 GLOB SERPL ELPH-MCNC: 0.4 G/DL — SIGNIFICANT CHANGE UP (ref 0.1–0.4)
ALPHA2 GLOB SERPL ELPH-MCNC: 0.6 G/DL — SIGNIFICANT CHANGE UP (ref 0.5–1)
ANA TITR SER: NEGATIVE — SIGNIFICANT CHANGE UP
AUTO DIFF PNL BLD: NEGATIVE — SIGNIFICANT CHANGE UP
B-GLOBULIN SERPL ELPH-MCNC: 0.8 G/DL — SIGNIFICANT CHANGE UP (ref 0.5–1)
C-ANCA SER-ACNC: NEGATIVE — SIGNIFICANT CHANGE UP
CULTURE RESULTS: SIGNIFICANT CHANGE UP
CULTURE RESULTS: SIGNIFICANT CHANGE UP
GAMMA GLOBULIN: 0.8 G/DL — SIGNIFICANT CHANGE UP (ref 0.6–1.6)
INTERPRETATION SERPL IFE-IMP: SIGNIFICANT CHANGE UP
MPO AB + PR3 PNL SER: SIGNIFICANT CHANGE UP
P-ANCA SER-ACNC: NEGATIVE — SIGNIFICANT CHANGE UP
PROT PATTERN SERPL ELPH-IMP: SIGNIFICANT CHANGE UP
SPECIMEN SOURCE: SIGNIFICANT CHANGE UP
SPECIMEN SOURCE: SIGNIFICANT CHANGE UP

## 2023-04-26 PROCEDURE — 99231 SBSQ HOSP IP/OBS SF/LOW 25: CPT

## 2023-04-26 PROCEDURE — 99233 SBSQ HOSP IP/OBS HIGH 50: CPT

## 2023-04-26 RX ADMIN — SENNA PLUS 2 TABLET(S): 8.6 TABLET ORAL at 21:50

## 2023-04-26 RX ADMIN — SIMETHICONE 80 MILLIGRAM(S): 80 TABLET, CHEWABLE ORAL at 21:49

## 2023-04-26 RX ADMIN — Medication 1 APPLICATION(S): at 17:05

## 2023-04-26 RX ADMIN — SIMETHICONE 80 MILLIGRAM(S): 80 TABLET, CHEWABLE ORAL at 06:55

## 2023-04-26 RX ADMIN — Medication 1 MILLIGRAM(S): at 13:08

## 2023-04-26 RX ADMIN — Medication 105 MILLIGRAM(S): at 06:55

## 2023-04-26 RX ADMIN — Medication 1 TABLET(S): at 13:08

## 2023-04-26 RX ADMIN — ENOXAPARIN SODIUM 40 MILLIGRAM(S): 100 INJECTION SUBCUTANEOUS at 21:50

## 2023-04-26 RX ADMIN — Medication 81 MILLIGRAM(S): at 13:08

## 2023-04-26 RX ADMIN — Medication 1 APPLICATION(S): at 06:56

## 2023-04-26 RX ADMIN — Medication 105 MILLIGRAM(S): at 13:08

## 2023-04-26 RX ADMIN — SIMETHICONE 80 MILLIGRAM(S): 80 TABLET, CHEWABLE ORAL at 13:08

## 2023-04-26 RX ADMIN — AMLODIPINE BESYLATE 5 MILLIGRAM(S): 2.5 TABLET ORAL at 06:55

## 2023-04-26 NOTE — PROGRESS NOTE ADULT - SUBJECTIVE AND OBJECTIVE BOX
Physical Medicine and Rehabilitation Progress Note :       Patient is a 63y old  Female who presents with a chief complaint of weakness UTI (26 Apr 2023 10:42)      HPI:  Ms. Parsons is a 63 year old female with medical history significant for PAD, alcohol use disorder, and degenerative arthritis in her spine, former smoker (quit 3.2020) coming in from her home by EMS due to inability to care for herself at home. She has been feeling increasingly weak for the past week, and as such, was reportedly sitting on the toilet for two days. She says she went to the toilet on Tuesday afternoon, and left her walker which she is reliant on, in the hallway. She then was too weak and could not ambulate to get up, and as such as been in place without food or water for two days. Her , who lives in a nursing home, had not heard from her for two days, and called EMS, who found her at her home. . Pt denies head injury or fall, denies cough, cold, SOB, CP, abdominal pain, diarrhea, constipation. Of note, she notes a erythematous painful rash under her breast and in her pelvic area that she believes worsened over the past two days. Denies any oozing from the rash. She drinks about 6-7 cans of beer daily for as long as she can remember and does not believe she has ever undergone withdrawal. Currently denying any bowel or bladder incontinence, though endorsing some  numbness in her upper legs and lower legs. States that she cannot walk on her legs since being stuck in the bathroom.  In the ED: VSS, Labs s/f macrocytosis, lactate 2.7 > 1.3, hyponatremic 133, BUN 24, Cr 1.26, BR 2.0, AST 49, , UA positive  EKG NSR  Course Nystatin, Tylenol, CTX, 2L Nacl     (21 Apr 2023 02:30)                            11.1   4.65  )-----------( 240      ( 25 Apr 2023 07:36 )             33.6       04-25    137  |  104  |  6<L>  ----------------------------<  98  3.8   |  24  |  0.63    Ca    8.2<L>      25 Apr 2023 07:36  Phos  3.6     04-25  Mg     1.7     04-25      Vital Signs Last 24 Hrs  T(C): 36.4 (26 Apr 2023 12:13), Max: 37.1 (26 Apr 2023 05:51)  T(F): 97.5 (26 Apr 2023 12:13), Max: 98.8 (26 Apr 2023 05:51)  HR: 70 (26 Apr 2023 12:13) (70 - 79)  BP: 99/66 (26 Apr 2023 12:13) (99/66 - 138/75)  BP(mean): --  RR: 16 (26 Apr 2023 12:13) (16 - 16)  SpO2: 98% (26 Apr 2023 12:13) (97% - 98%)    Parameters below as of 26 Apr 2023 12:13  Patient On (Oxygen Delivery Method): room air        MEDICATIONS  (STANDING):  amLODIPine   Tablet 5 milliGRAM(s) Oral daily  aspirin enteric coated 81 milliGRAM(s) Oral daily  bisacodyl Suppository 10 milliGRAM(s) Rectal daily  clotrimazole 1% Cream 1 Application(s) Topical two times a day  enoxaparin Injectable 40 milliGRAM(s) SubCutaneous every 24 hours  folic acid 1 milliGRAM(s) Oral daily  multivitamin 1 Tablet(s) Oral daily  polyethylene glycol 3350 17 Gram(s) Oral daily  senna 2 Tablet(s) Oral at bedtime  simethicone 80 milliGRAM(s) Chew three times a day    MEDICATIONS  (PRN):  acetaminophen     Tablet .. 650 milliGRAM(s) Oral every 6 hours PRN Temp greater or equal to 38C (100.4F), Mild Pain (1 - 3)         Functional Status Assessment :         Pain Assessment/Number Scale (0-10) Adult  Presence of Pain: denies pain/discomfort (Rating = 0)  Pain Rating (0-10): Rest: 0 (no pain/absence of nonverbal indicators of pain)  Pain Rating (0-10): Activity: 0 (no pain/absence of nonverbal indicators of pain)    Safety      AM-PAC Functional Assessment: Basic Mobility  Type of Assessment: Daily assessment  Turning from your back to your side while in a flat bed without using bedrails?: 3 = A little assistance  Moving from lying on your back to sitting on the flat side of a flat bed without using bedrails?: 3 = A little assistance  Moving to and from a bed to a chair (including a wheelchair)?: 3 = A little assistance  Standing up from a chair using your arms (e.g. wheelchair or bedside chair)?: 3 = A little assistance  Walking in hospital room?: 3 = A little assistance  Climbing 3-5 steps with a railing?: 3 = A little assistance  Score: 18   Row Comment: Ask the patient "How much help from another person do you currently need? (If the patient hasn't done an activity recently, how much help from another person do you think he/she needs if he/she tried?)    Cognitive/Neuro      Cognitive/Neuro/Behavioral  Cognitive/Neuro/Behavioral [WDL Definition: Alert; opens eyes spontaneously; arouses to voice or touch; oriented x 4; follows commands; speech spontaneous, logical; purposeful motor response; behavior appropriate to situation]: WDL    Language Assistance  Preferred Language to Address Healthcare Preferred Language to Address Healthcare: English    Therapeutic Interventions      Bed Mobility  Bed Mobility Training Rolling/Turning: contact guard;  1 person assist;  nonverbal cues (demo/gestures);  verbal cues;  bed rails  Bed Mobility Training Supine-to-Sit: minimum assist (75% patient effort);  1 person assist;  verbal cues;  nonverbal cues (demo/gestures);  bed rails  Bed Mobility Training Limitations: decreased ability to use arms for pushing/pulling;  decreased ability to use legs for bridging/pushing;  impaired ability to control trunk for mobility;  decreased strength;  impaired balance;  impaired postural control    Sit-Stand Transfer Training  Transfer Training Sit-to-Stand Transfer: minimum assist (75% patient effort);  2 person assist;  nonverbal cues (demo/gestures);  verbal cues;  rolling walker  Transfer Training Stand-to-Sit Transfer: minimum assist (75% patient effort);  2 person assist;  verbal cues;  nonverbal cues (demo/gestures);  rolling walker  Sit-to-Stand Transfer Training Transfer Safety Analysis: decreased balance;  decreased sequencing ability;  decreased step length;  decreased weight-shifting ability;  decreased strength;  impaired balance;  impaired postural control;  rolling walker    Gait Training  Gait Training: contact guard;  minimum assist (75% patient effort);  1 person assist;  nonverbal cues (demo/gestures);  verbal cues;  rolling walker;  50 feet  Gait Analysis: 3-point gait   decreased daniela;  crouch;  increased time in double stance;  decreased hip/knee flexion;  shuffling;  decreased step length;  decreased trunk rotation;  decreased weight-shifting ability;  decreased strength;  impaired balance;  impaired postural control;  50 feet;  rolling walker      Toilet Transfer Training  Transfer Training Toilet Transfer: moderate assist (50% patient effort);  1 person assist;  verbal cues;  rolling walker;  grab bars  Toilet Transfer Training Transfer Safety Analysis: decreased strength    Therapeutic Exercise  Therapeutic Exercise Charges: patient engaged in functional mob in room with CGA and RW, no LOB noted, good weigh shifting present     Grooming Training  Grooming Training Charges: hand hygiene at sink   Grooming Training Assistance: stand-by assist;  impaired balance    Toilet Training  Toilet Training Charges: seated perineal hygiene and clothing management   Toilet Training Assistance: stand-by assist;  verbal cues;  impaired balance            PM&R Impression : as above    Current Disposition Plan Recommendations :    subacute rehab placement

## 2023-04-26 NOTE — PROGRESS NOTE ADULT - SUBJECTIVE AND OBJECTIVE BOX
###INCOMPLETE###    O/N Events:    Subjective/ROS: Patient seen and examined at bedside. Denies fevers, chills, HA, CP, SOB, n/v, changes in bowel/urinary habits.  12pt ROS otherwise negative.    VITALS  Vital Signs Last 24 Hrs  T(C): 37.1 (26 Apr 2023 05:51), Max: 37.1 (26 Apr 2023 05:51)  T(F): 98.8 (26 Apr 2023 05:51), Max: 98.8 (26 Apr 2023 05:51)  HR: 79 (26 Apr 2023 05:51) (74 - 79)  BP: 138/75 (26 Apr 2023 05:51) (110/68 - 138/75)  BP(mean): --  RR: 16 (26 Apr 2023 05:51) (16 - 18)  SpO2: 97% (26 Apr 2023 05:51) (97% - 99%)    Parameters below as of 26 Apr 2023 05:51  Patient On (Oxygen Delivery Method): room air        CAPILLARY BLOOD GLUCOSE          PHYSICAL EXAM  General: NAD  Head: NC/AT; MMM; PERRL; EOMI;  Neck: Supple; no JVD  Respiratory: CTAB; no wheezes/rales/rhonchi  Cardiovascular: Regular rhythm/rate; S1/S2+, no murmurs, rubs gallops   Gastrointestinal: Soft; NTND; bowel sounds normal and present  Extremities: WWP; no edema/cyanosis  Neurological: A&Ox3, CNII-XII grossly intact; no obvious focal deficits    MEDICATIONS  (STANDING):  amLODIPine   Tablet 5 milliGRAM(s) Oral daily  aspirin enteric coated 81 milliGRAM(s) Oral daily  bisacodyl Suppository 10 milliGRAM(s) Rectal daily  clotrimazole 1% Cream 1 Application(s) Topical two times a day  enoxaparin Injectable 40 milliGRAM(s) SubCutaneous every 24 hours  folic acid 1 milliGRAM(s) Oral daily  multivitamin 1 Tablet(s) Oral daily  polyethylene glycol 3350 17 Gram(s) Oral daily  senna 2 Tablet(s) Oral at bedtime  simethicone 80 milliGRAM(s) Chew three times a day  thiamine IVPB 500 milliGRAM(s) IV Intermittent every 8 hours    MEDICATIONS  (PRN):  acetaminophen     Tablet .. 650 milliGRAM(s) Oral every 6 hours PRN Temp greater or equal to 38C (100.4F), Mild Pain (1 - 3)      No Known Allergies      LABS                        11.1   4.65  )-----------( 240      ( 25 Apr 2023 07:36 )             33.6     04-25    137  |  104  |  6<L>  ----------------------------<  98  3.8   |  24  |  0.63    Ca    8.2<L>      25 Apr 2023 07:36  Phos  3.6     04-25  Mg     1.7     04-25                  IMAGING/EKG/ETC   O/N Events: naeo    Subjective/ROS: Patient seen and examined at bedside, feels well this morning and looking forward to working with PT. Otherwise, she denies fevers, chills, HA, CP, SOB, n/v, changes in bowel/urinary habits.  12pt ROS otherwise negative.    VITALS  Vital Signs Last 24 Hrs  T(C): 37.1 (26 Apr 2023 05:51), Max: 37.1 (26 Apr 2023 05:51)  T(F): 98.8 (26 Apr 2023 05:51), Max: 98.8 (26 Apr 2023 05:51)  HR: 79 (26 Apr 2023 05:51) (74 - 79)  BP: 138/75 (26 Apr 2023 05:51) (110/68 - 138/75)  BP(mean): --  RR: 16 (26 Apr 2023 05:51) (16 - 18)  SpO2: 97% (26 Apr 2023 05:51) (97% - 99%)    Parameters below as of 26 Apr 2023 05:51  Patient On (Oxygen Delivery Method): room air        CAPILLARY BLOOD GLUCOSE    PHYSICAL EXAM  General: NAD, pleasant  Head: NC/AT; MMM; PERRL; EOMI w/ conjugate gaze deviation of L eye   Neck: Supple; no JVD  Respiratory: CTAB; no wheezes/rales/rhonchi  Cardiovascular: Regular rhythm/rate; S1/S2+, no murmurs, rubs gallops   Gastrointestinal: Soft; slightly distended and tender + tympanitic; bowel sounds normal and present  Extremities: WWP; no edema/cyanosis  Neurological: A&Ox3, no obvious focal deficits    MEDICATIONS  (STANDING):  amLODIPine   Tablet 5 milliGRAM(s) Oral daily  aspirin enteric coated 81 milliGRAM(s) Oral daily  bisacodyl Suppository 10 milliGRAM(s) Rectal daily  clotrimazole 1% Cream 1 Application(s) Topical two times a day  enoxaparin Injectable 40 milliGRAM(s) SubCutaneous every 24 hours  folic acid 1 milliGRAM(s) Oral daily  multivitamin 1 Tablet(s) Oral daily  polyethylene glycol 3350 17 Gram(s) Oral daily  senna 2 Tablet(s) Oral at bedtime  simethicone 80 milliGRAM(s) Chew three times a day  thiamine IVPB 500 milliGRAM(s) IV Intermittent every 8 hours    MEDICATIONS  (PRN):  acetaminophen     Tablet .. 650 milliGRAM(s) Oral every 6 hours PRN Temp greater or equal to 38C (100.4F), Mild Pain (1 - 3)      No Known Allergies      LABS                        11.1   4.65  )-----------( 240      ( 25 Apr 2023 07:36 )             33.6     04-25    137  |  104  |  6<L>  ----------------------------<  98  3.8   |  24  |  0.63    Ca    8.2<L>      25 Apr 2023 07:36  Phos  3.6     04-25  Mg     1.7     04-25                  IMAGING/EKG/ETC

## 2023-04-26 NOTE — PROGRESS NOTE ADULT - ASSESSMENT
Ms. Parsons is a 63-year old female with medical history significant for PAD and degenerative arthritis in her spine, former smoker (quit -2020) coming in from her home by EMS due to inability to care for herself at home, admitted for LE weakness and UTI now pending AJ authorization.

## 2023-04-26 NOTE — PROGRESS NOTE ADULT - ASSESSMENT
63 year old female presented with new onset LE weakness started after being sitting on toilet from Monday PM to Thursday AM. She uses walker at baseline since about 10 years ago s/p RLE fibular surgery, she as was able to walk to bathroom w/o walker. Started experiencing b/l LE weakness after being moved from toilet seat. She also has PMHx of chronic EtOH which make her more prone to nerve injury. Folate 3.1, vitamine B12 304 (lower limit), homocysteine 19.1. CRP 21.7. Hb 11.3 . HbA1c and TSH normal.     MRI lumbar spine resulted as above which may explain the radiculopathy. 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     Recommendations:   Continue with the current supplements.   Continue with PT/OT/PMNR  Patient can follow up with outpatient neurologist after discharge.     Thank you for the courtesy of this consult, we sign off the case. please contact us if any neurological changes or questions.   63 year old female presented with new onset LE weakness started after being sitting on toilet from Monday PM to Thursday AM. She uses walker at baseline since about 10 years ago s/p RLE fibular surgery, she as was able to walk to bathroom w/o walker. Started experiencing b/l LE weakness after being moved from toilet seat. She also has PMHx of chronic EtOH which make her more prone to nerve injury. Folate 3.1, vitamine B12 304 (lower limit), homocysteine 19.1. CRP 21.7. Hb 11.3 . HbA1c and TSH normal.     MRI lumbar spine resulted as above which may explain the radiculopathy. 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     Recommendations:   Continue with the current supplements.   Continue with PT/OT/PMNR  Patient should follow up with outpatient neurologist after discharge.     Thank you for the courtesy of this consult, we sign off the case. please contact us if any neurological changes or questions.

## 2023-04-26 NOTE — PROGRESS NOTE ADULT - PROBLEM SELECTOR PLAN 1
DDx includes alcohol neuropathy, B12 neuropathy, deconditioning.     Plan:   - PT recommending AJ, to work with again today  - CT head negative for acute process  - MRI lumbar spine w/ R sided disk herniation   - Vitamin B12 injections  - folate   - PT recs AJ

## 2023-04-26 NOTE — PROGRESS NOTE ADULT - ASSESSMENT
IM     63 year old female with medical history significant for PAD and degenerative arthritis in her spine, former smoker (quit -2020) coming in from her home by EMS due to inability to care for herself at home, admitted for LE weakness and UTI now pending AJ authorization.       Nutritional Assessment:  · Nutritional Assessment	This patient has been assessed with a concern for Malnutrition and has been determined to have a diagnosis/diagnoses of Mild protein-calorie malnutrition.    This patient is being managed with:   Diet Regular-  Entered: Apr 21 2023  3:13AM    Problem/Plan - 1:  ·  Problem: Weakness.   ·  Plan: DDx includes alcohol neuropathy, B12 neuropathy, deconditioning.     Plan:   - PT recommending JA, to work with again today  - CT head negative for acute process  - MRI lumbar spine w/ R sided disk herniation   - Vitamin B12 injections  - folate   - PT recs AJ.    Problem/Plan - 2:  ·  Problem: UTI (urinary tract infection).   ·  Plan: RESOLVED.  e coli sensitive to CTX, s/p day 3. with resulotion of symptoms.       #Fungal Rash-patient presented with erythemmatous rash under breast and in groin, likely 2/2 fungal etiology.  -continue with nystatin powder and clotrimazole cream.    Problem/Plan - 3:  ·  Problem: Mild HTN.   ·  Plan: Well controlled on current regimen.     - c/w amlodipine 5mg daily.    Problem/Plan - 4:  ·  Problem: PAD (peripheral artery disease).   ·  Plan: resume home aspirin  pt previously on statin but no longer taking - unclear why  outpt f/u.    Problem/Plan - 5:  ·  Problem: AA (alcohol abuse).   ·  Plan: Drinks 6-7 cans of beer "for as long as she can remember." As per patient, she has no recollection of ever withdrawing from alcohol; however, does not believe she has abstained for more than 3-4 days. Last drink was Tuesday AM.    NTD other than  on alcohol cessation. Did not require Ativan throughout first days of hospitalization.    Problem/Plan - 6:  ·  Problem: Macrocytosis.   ·  Plan: -likely 2/2 etoh use.      Plan:   - b12   - folate supplementation.    Problem/Plan - 7:  ·  Problem: Arthritis.   ·  Plan: Patient with history of spine arthritis, reliant on walker.   -tylenol for pain  -lidocaine patch if needed.  -pt/ot as above.    Problem/Plan - 8:  ·  Problem: Preventive measure.   ·  Plan: F none  E-replete PRN  N-regular diet  DVT-lovenox.

## 2023-04-26 NOTE — PROGRESS NOTE ADULT - SUBJECTIVE AND OBJECTIVE BOX
Neurology Progress Note    Interval History:    Patient was seen and examined, no acute event over night.     Medications:  acetaminophen     Tablet .. 650 milliGRAM(s) Oral every 6 hours PRN  amLODIPine   Tablet 5 milliGRAM(s) Oral daily  aspirin enteric coated 81 milliGRAM(s) Oral daily  bisacodyl Suppository 10 milliGRAM(s) Rectal daily  clotrimazole 1% Cream 1 Application(s) Topical two times a day  enoxaparin Injectable 40 milliGRAM(s) SubCutaneous every 24 hours  folic acid 1 milliGRAM(s) Oral daily  multivitamin 1 Tablet(s) Oral daily  polyethylene glycol 3350 17 Gram(s) Oral daily  senna 2 Tablet(s) Oral at bedtime  simethicone 80 milliGRAM(s) Chew three times a day      Vital Signs Last 24 Hrs  T(C): 36.4 (26 Apr 2023 12:13), Max: 37.1 (26 Apr 2023 05:51)  T(F): 97.5 (26 Apr 2023 12:13), Max: 98.8 (26 Apr 2023 05:51)  HR: 70 (26 Apr 2023 12:13) (70 - 79)  BP: 99/66 (26 Apr 2023 12:13) (99/66 - 138/75)  BP(mean): --  RR: 16 (26 Apr 2023 12:13) (16 - 16)  SpO2: 98% (26 Apr 2023 12:13) (97% - 98%)    Parameters below as of 26 Apr 2023 12:13  Patient On (Oxygen Delivery Method): room air        PHYSICAL EXAMINATION:  GEN: NAD, pleasant, cooperative  NEURO:   MENTAL STATUS: AAOx3  LANG/SPEECH: Fluent, intact naming, repetition & comprehension  CRANIAL NERVES:  II: Pupils equal and reactive, no RAPD, normal visual field and fundus  III, IV, VI: EOM intact, no gaze preference or deviation  V: normal  VII: no facial asymmetry  VIII: normal hearing to speech  MOTOR: 5/5 in both upper, L shoulder 4/5, LE : L hip 4-/5, Knee 4-/5, RLE Hip 4-/5, Knee 5/5, Plantar dorsi, Eversion, Flex, inversion R 4+/5 , L 4-/5.   REFLEXES: 2/4 throughout, bilateral flexor plantars, patellar 3+ b/l.   SENSORY: Normal to touch in all extremities except lateral aspect of R leg decreased to LT and temp, Vibration decreased b/l below the ankles, proprioception intact.   COORD: Normal finger to nose, Possible slight dysmetria, no dysdiadochokinesia.   Gait: able to walk with walker.     Labs:  CBC Full  -  ( 25 Apr 2023 07:36 )  WBC Count : 4.65 K/uL  RBC Count : 2.91 M/uL  Hemoglobin : 11.1 g/dL  Hematocrit : 33.6 %  Platelet Count - Automated : 240 K/uL  Mean Cell Volume : 115.5 fl  Mean Cell Hemoglobin : 38.1 pg  Mean Cell Hemoglobin Concentration : 33.0 gm/dL  Auto Neutrophil # : x  Auto Lymphocyte # : x  Auto Monocyte # : x  Auto Eosinophil # : x  Auto Basophil # : x  Auto Neutrophil % : x  Auto Lymphocyte % : x  Auto Monocyte % : x  Auto Eosinophil % : x  Auto Basophil % : x    04-25    137  |  104  |  6<L>  ----------------------------<  98  3.8   |  24  |  0.63    Ca    8.2<L>      25 Apr 2023 07:36  Phos  3.6     04-25  Mg     1.7     04-25    RADIOLOGY & ADDITIONAL TESTS:  < from: MR Lumbar Spine No Cont (04.25.23 @ 22:31) >    IMPRESSION:    Right sided disc herniation at L4-5 displaces the right L5 root as   discussed above and may explain a radiculopathy.    No significant spinal stenosis. Other level disc degeneration includes   disc bulges and an annular fissure at L3-4. Filum is fatty but without a   tethered cord.    < end of copied text >

## 2023-04-27 DIAGNOSIS — E86.0 DEHYDRATION: ICD-10-CM

## 2023-04-27 DIAGNOSIS — B37.9 CANDIDIASIS, UNSPECIFIED: ICD-10-CM

## 2023-04-27 LAB
% GAMMA, URINE: 12.3 % — SIGNIFICANT CHANGE UP
ALBUMIN 24H MFR UR ELPH: 13.5 % — SIGNIFICANT CHANGE UP
ALPHA1 GLOB 24H MFR UR ELPH: 33.7 % — SIGNIFICANT CHANGE UP
ALPHA2 GLOB 24H MFR UR ELPH: 19 % — SIGNIFICANT CHANGE UP
B-GLOBULIN 24H MFR UR ELPH: 21.5 % — SIGNIFICANT CHANGE UP
INTERPRETATION 24H UR IFE-IMP: SIGNIFICANT CHANGE UP
INTERPRETATION 24H UR IFE-IMP: SIGNIFICANT CHANGE UP
M PROTEIN 24H UR ELPH-MRATE: SIGNIFICANT CHANGE UP
PROT PATTERN 24H UR ELPH-IMP: SIGNIFICANT CHANGE UP
VIT B1 SERPL-MCNC: 313.6 NMOL/L — HIGH (ref 66.5–200)

## 2023-04-27 PROCEDURE — 99231 SBSQ HOSP IP/OBS SF/LOW 25: CPT

## 2023-04-27 PROCEDURE — 93010 ELECTROCARDIOGRAM REPORT: CPT

## 2023-04-27 RX ORDER — AMLODIPINE BESYLATE 2.5 MG/1
1 TABLET ORAL
Qty: 0 | Refills: 0 | DISCHARGE
Start: 2023-04-27

## 2023-04-27 RX ADMIN — Medication 1 MILLIGRAM(S): at 11:16

## 2023-04-27 RX ADMIN — AMLODIPINE BESYLATE 5 MILLIGRAM(S): 2.5 TABLET ORAL at 06:26

## 2023-04-27 RX ADMIN — Medication 81 MILLIGRAM(S): at 11:17

## 2023-04-27 RX ADMIN — SENNA PLUS 2 TABLET(S): 8.6 TABLET ORAL at 22:28

## 2023-04-27 RX ADMIN — ENOXAPARIN SODIUM 40 MILLIGRAM(S): 100 INJECTION SUBCUTANEOUS at 22:28

## 2023-04-27 RX ADMIN — Medication 1 APPLICATION(S): at 18:09

## 2023-04-27 RX ADMIN — SIMETHICONE 80 MILLIGRAM(S): 80 TABLET, CHEWABLE ORAL at 06:26

## 2023-04-27 RX ADMIN — Medication 1 APPLICATION(S): at 06:27

## 2023-04-27 RX ADMIN — Medication 1 TABLET(S): at 11:16

## 2023-04-27 NOTE — DISCHARGE NOTE PROVIDER - PROVIDER TOKENS
PROVIDER:[TOKEN:[[id number]],FOLLOWUP:[1 week]] PROVIDER:[TOKEN:[[id number]],FOLLOWUP:[1 week]],PROVIDER:[TOKEN:[[id number]],FOLLOWUP:[2 weeks]]

## 2023-04-27 NOTE — DISCHARGE NOTE PROVIDER - CARE PROVIDERS DIRECT ADDRESSES
,[email or web address]

## 2023-04-27 NOTE — DISCHARGE NOTE PROVIDER - NSDCMRMEDTOKEN_GEN_ALL_CORE_FT
aspirin 81 mg oral capsule: 1 orally once a day   amLODIPine 5 mg oral tablet: 1 tab(s) orally once a day  aspirin 81 mg oral capsule: 1 orally once a day  clotrimazole 1% topical cream: 1 Apply topically to affected area 2 times a day   amLODIPine 5 mg oral tablet: 1 tab(s) orally once a day  aspirin 81 mg oral capsule: 1 orally once a day  clotrimazole 1% topical cream: 1 Apply topically to affected area 2 times a day  Hydroxocobalamin 1000 mcg/mL injectable solution: 1,000 microgram(s) intramuscularly once a week   amLODIPine 5 mg oral tablet: 1 tab(s) orally once a day  aspirin 81 mg oral capsule: 1 orally once a day  clotrimazole 1% topical cream: 1 Apply topically to affected area 2 times a day  folic acid 1 mg oral tablet: 1 tab(s) orally once a day  Hydroxocobalamin 1000 mcg/mL injectable solution: 1,000 microgram(s) intramuscularly once a week

## 2023-04-27 NOTE — DISCHARGE NOTE PROVIDER - HOSPITAL COURSE
Ms. Parsons is a 63 year old female with medical history significant for PAD, alcohol use disorder, and degenerative arthritis in her spine, former smoker (quit 3.2020) coming in from her home by EMS due to inability to care for herself at home. She has been feeling increasingly weak for the past week, and as such, was reportedly sitting on the toilet for two days. She says she went to the toilet on Tuesday afternoon, and left her walker which she is reliant on, in the hallway. She then was too weak and could not ambulate to get up, and as such as been in place without food or water for two days. Her , who lives in a nursing home, had not heard from her for two days, and called EMS, who found her at her home. . Pt denies head injury or fall, denies cough, cold, SOB, CP, abdominal pain, diarrhea, constipation. Of note, she notes a erythematous painful rash under her breast and in her pelvic area that she believes worsened over the past two days. Denies any oozing from the rash. She drinks about 6-7 cans of beer daily for as long as she can remember and does not believe she has ever undergone withdrawal. Currently denying any bowel or bladder incontinence, though endorsing some  numbness in her upper legs and lower legs. States that she cannot walk on her legs since being stuck in the bathroom.    ·  Problem: Weakness.   ·  Plan: DDx includes alcohol neuropathy, B12 neuropathy, deconditioning.     - PT recommending AJ, to work with again today  - CT head negative for acute process  - MRI lumbar spine w/o IV contrast pending  - B12/Folate borderline/low -  will receive another dose of IM B12 today  - s/p thiamine 500mg IV TID for 5 days  - folate  - PT recs AJ.     Problem/Plan - 2:  ·  Problem: UTI (urinary tract infection).   ·  Plan: RESOLVED.  e coli sensitive to CTX, s/p day 3. with resulotion of symptoms.       #Fungal Rash-patient presented with erythemmatous rash under breast and in groin, likely 2/2 fungal etiology.  -continue with nystatin powder and clotrimazole cream.     Problem/Plan - 3:  ·  Problem: Mild HTN.   ·  Plan: Well controlled on current regimen.     - c/w amlodipine 5mg daily.     Problem/Plan - 4:  ·  Problem: PAD (peripheral artery disease).   ·  Plan: resume home aspirin  pt previously on statin but no longer taking - unclear why  outpt f/u.     Problem/Plan - 5:  ·  Problem: AA (alcohol abuse).   ·  Plan: Drinks 6-7 cans of beer "for as long as she can remember." As per patient, she has no recollection of ever withdrawing from alcohol; however, does not believe she has abstained for more than 3-4 days. Last drink was Tuesday AM.    NTD other than  on alcohol cessation. Did not require Ativan throughout first days of hospitalization.     Problem/Plan - 6:  ·  Problem: Macrocytosis.   ·  Plan: -likely 2/2 etoh use.      Plan:   - b12   - folate supplementation.     Problem/Plan - 7:  ·  Problem: Arthritis.   ·  Plan: Patient with history of spine arthritis, reliant on walker.   -tylenol for pain  -lidocaine patch if needed.  -pt/ot as above.     Problem/Plan - 8:  ·  Problem: Preventive measure.   ·  Plan: F none  E-replete PRN  N-regular diet  DVT-lovenox.   Ms. Parsons is a 63 year old female with medical history significant for PAD, alcohol use disorder, and degenerative arthritis in her spine, former smoker (quit 3.2020) coming in from her home by EMS due to inability to care for herself at home. She has been feeling increasingly weak for the past week, and as such, was reportedly sitting on the toilet for two days. She says she went to the toilet on Tuesday afternoon, and left her walker which she is reliant on, in the hallway. She then was too weak and could not ambulate to get up, and as such as been in place without food or water for two days. Her , who lives in a nursing home, had not heard from her for two days, and called EMS, who found her at her home. . Pt denies head injury or fall, denies cough, cold, SOB, CP, abdominal pain, diarrhea, constipation. Of note, she notes a erythematous painful rash under her breast and in her pelvic area that she believes worsened over the past two days. Denies any oozing from the rash. She drinks about 6-7 cans of beer daily for as long as she can remember and does not believe she has ever undergone withdrawal. Currently denying any bowel or bladder incontinence, though endorsing some  numbness in her upper legs and lower legs. States that she cannot walk on her legs since being stuck in the bathroom.    Weakness.   CT head negative for acute process, with patchy areas of hypodensity within the periventricular white matter which is nonspecific and may represent sequela small vessel ischemic disease in this patient.  MRI lumbar spine:   Right sided disc herniation at L4-5 displaces the right L5 root as discussed above and may explain a radiculopathy.  No significant spinal stenosis. Other level disc degeneration includes disc bulges and an annular fissure at L3-4. Filum is fatty but without a tethered cord.  - B12/Folate borderline/low -  started on Vitamin B12 injections  - Received thiamine 500mg IV TID for 5 days  - Folate  - physical therapy     UTI (urinary tract infection).   e coli sensitive to CTX, treated for three days with resolution of symptoms.     Fungal Rash  - patient presented with erythemmatous rash under breast and in groin, likely 2/2 fungal etiology.  -continue with nystatin powder and clotrimazole cream.      Mild HTN.   ·  Plan: Well controlled on current regimen.   - c/w amlodipine 5mg daily.      PAD (peripheral artery disease).   ·  Plan: resume home aspirin  pt previously on statin but no longer taking - unclear why  outpt f/u.    AA (alcohol abuse).   ·  Plan: Drinks 6-7 cans of beer "for as long as she can remember." As per patient, she has no recollection of ever withdrawing from alcohol; however, does not believe she has abstained for more than 3-4 days.  - NTD other than  on alcohol cessation. Did not require Ativan throughout first days of hospitalization.    Macrocytosis.   ·  Plan: -likely 2/2 etoh use.  Plan:   - b12   - folate supplementation.    Arthritis.   ·  Plan: Patient with history of spine arthritis, reliant on walker.   -tylenol for pain  -lidocaine patch if needed.  -pt/ot as above.    Physical exam at discharge:  PHYSICAL EXAM  General: NAD, pleasant  Head: NC/AT; MMM; PERRL; EOMI w/ conjugate gaze deviation of L eye   Neck: Supple; no JVD  Respiratory: CTAB; no wheezes/rales/rhonchi  Cardiovascular: Regular rhythm/rate; S1/S2+, no murmurs, rubs gallops   Gastrointestinal: Soft; slightly distended and tender + tympanitic; bowel sounds normal and present  Extremities: WWP; no edema/cyanosis  Neurological: A&Ox3, no obvious focal deficits  Skin: Fungal rash under breasts and in intertriginous areas     New medications on discharge: Vitamin B12 injections  Labs to be followed up: None  Imaging to be done as outpatient: None  Further outpatient workup:    Ms. Parsons is a 63 year old female with medical history significant for PAD, alcohol use disorder, and degenerative arthritis in her spine, former smoker (quit 3.2020) coming in from her home by EMS due to inability to care for herself at home. She has been feeling increasingly weak for the past week, and as such, was reportedly sitting on the toilet for two days. She says she went to the toilet on Tuesday afternoon, and left her walker which she is reliant on, in the hallway. She then was too weak and could not ambulate to get up, and as such as been in place without food or water for two days. Her , who lives in a nursing home, had not heard from her for two days, and called EMS, who found her at her home. . Pt denies head injury or fall, denies cough, cold, SOB, CP, abdominal pain, diarrhea, constipation. Of note, she notes a erythematous painful rash under her breast and in her pelvic area that she believes worsened over the past two days. Denies any oozing from the rash. She drinks about 6-7 cans of beer daily for as long as she can remember and does not believe she has ever undergone withdrawal. Currently denying any bowel or bladder incontinence, though endorsing some  numbness in her upper legs and lower legs. States that she cannot walk on her legs since being stuck in the bathroom.    Weakness.   CT head negative for acute process, with patchy areas of hypodensity within the periventricular white matter which is nonspecific and may represent sequela small vessel ischemic disease in this patient.  MRI lumbar spine:   Right sided disc herniation at L4-5 displaces the right L5 root as discussed above and may explain a radiculopathy.  No significant spinal stenosis. Other level disc degeneration includes disc bulges and an annular fissure at L3-4. Filum is fatty but without a tethered cord.  - B12/Folate borderline/low -  started on Vitamin B12 injections  - Received thiamine 500mg IV TID for 5 days  - Folate  - physical therapy     UTI (urinary tract infection).   e coli sensitive to CTX, treated for three days with resolution of symptoms.     Fungal Rash  - patient presented with erythemmatous rash under breast and in groin, likely 2/2 fungal etiology.  -continue with nystatin powder and clotrimazole cream.      Mild HTN.   ·  Plan: Well controlled on current regimen.   - c/w amlodipine 5mg daily.      PAD (peripheral artery disease).   ·  Plan: resume home aspirin  pt previously on statin but no longer taking - unclear why  outpt f/u.    AA (alcohol abuse).   ·  Plan: Drinks 6-7 cans of beer "for as long as she can remember." As per patient, she has no recollection of ever withdrawing from alcohol; however, does not believe she has abstained for more than 3-4 days.  - NTD other than  on alcohol cessation. Did not require Ativan throughout first days of hospitalization.    Macrocytosis.   ·  Plan: -likely 2/2 etoh use.  Plan:   - b12   - folate supplementation.    Arthritis.   ·  Plan: Patient with history of spine arthritis, reliant on walker.   -tylenol for pain  -lidocaine patch if needed.  -pt/ot as above.    Physical exam at discharge:  PHYSICAL EXAM  General: NAD, pleasant  Head: NC/AT; MMM; PERRL; EOMI w/ conjugate gaze deviation of L eye   Neck: Supple; no JVD  Respiratory: CTAB; no wheezes/rales/rhonchi  Cardiovascular: Regular rhythm/rate; S1/S2+, no murmurs, rubs gallops   Gastrointestinal: Soft; slightly distended and tender + tympanitic; bowel sounds normal and present  Extremities: WWP; no edema/cyanosis  Neurological: A&Ox3, no obvious focal deficits  Skin: Fungal rash under breasts and in intertriginous areas     New medications on discharge: Vitamin B12 injections  Labs to be followed up: None  Imaging to be done as outpatient: None  Further outpatient workup: Regular follow up care    Ms. Parsons is a 63 year old female with medical history significant for PAD, alcohol use disorder, and degenerative arthritis in her spine, former smoker (quit 3.2020) coming in from her home by EMS due to inability to care for herself at home. She has been feeling increasingly weak for the past week, and as such, was reportedly sitting on the toilet for two days. She says she went to the toilet on Tuesday afternoon, and left her walker which she is reliant on, in the hallway. She then was too weak and could not ambulate to get up, and as such as been in place without food or water for two days. Her , who lives in a nursing home, had not heard from her for two days, and called EMS, who found her at her home. . Pt denies head injury or fall, denies cough, cold, SOB, CP, abdominal pain, diarrhea, constipation. Of note, she notes a erythematous painful rash under her breast and in her pelvic area that she believes worsened over the past two days. Denies any oozing from the rash. She drinks about 6-7 cans of beer daily for as long as she can remember and does not believe she has ever undergone withdrawal. Currently denying any bowel or bladder incontinence, though endorsing some  numbness in her upper legs and lower legs. States that she cannot walk on her legs since being stuck in the bathroom.    Weakness.   CT head negative for acute process, with patchy areas of hypodensity within the periventricular white matter which is nonspecific and may represent sequela small vessel ischemic disease in this patient.  MRI lumbar spine:   Right sided disc herniation at L4-5 displaces the right L5 root as discussed above and may explain a radiculopathy.  No significant spinal stenosis. Other level disc degeneration includes disc bulges and an annular fissure at L3-4. Filum is fatty but without a tethered cord.  - B12/Folate borderline/low -  started on Vitamin B12 injections  - Received thiamine 500mg IV TID for 5 days  - Folate  - physical therapy     UTI (urinary tract infection).   e coli sensitive to CTX, treated for three days with resolution of symptoms.     Fungal Rash  - patient presented with erythematous rash under breast and in groin, likely 2/2 fungal etiology.  -continue with nystatin powder and clotrimazole cream.      Mild HTN.   ·  Plan: Well controlled on current regimen.   - c/w amlodipine 5mg daily.      PAD (peripheral artery disease).   ·  Plan: resume home aspirin  pt previously on statin but no longer taking - unclear why  outpt f/u.    AA (alcohol abuse).   ·  Plan: Drinks 6-7 cans of beer "for as long as she can remember." As per patient, she has no recollection of ever withdrawing from alcohol; however, does not believe she has abstained for more than 3-4 days.  - NTD other than  on alcohol cessation. Did not require Ativan throughout first days of hospitalization.    Macrocytosis.   ·  Plan: -likely 2/2 etoh use.  Plan:   - b12   - folate supplementation.    Arthritis.   ·  Plan: Patient with history of spine arthritis, reliant on walker.   -tylenol for pain  -lidocaine patch if needed.  -pt/ot as above.    Physical exam at discharge:  PHYSICAL EXAM  General: NAD, pleasant  Head: NC/AT; MMM; PERRL; EOMI w/ conjugate gaze deviation of L eye   Neck: Supple; no JVD  Respiratory: CTAB; no wheezes/rales/rhonchi  Cardiovascular: Regular rhythm/rate; S1/S2+, no murmurs, rubs gallops   Gastrointestinal: Soft; slightly distended and tender + tympanitic; bowel sounds normal and present  Extremities: WWP; no edema/cyanosis  Neurological: A&Ox3, no obvious focal deficits  Skin: Fungal rash under breasts and in intertriginous areas     New medications on discharge: Vitamin B12 injections  Labs to be followed up: None  Imaging to be done as outpatient: None  Further outpatient workup: Regular follow up care    Ms. Parsons is a 63 year old female with medical history significant for PAD, alcohol use disorder, and degenerative arthritis in her spine, former smoker (quit 3.2020) coming in from her home by EMS due to inability to care for herself at home. She has been feeling increasingly weak for the past week, and as such, was reportedly sitting on the toilet for two days. She says she went to the toilet on Tuesday afternoon, and left her walker which she is reliant on, in the hallway. She then was too weak and could not ambulate to get up, and as such as been in place without food or water for two days. Her , who lives in a nursing home, had not heard from her for two days, and called EMS, who found her at her home. . Pt denies head injury or fall, denies cough, cold, SOB, CP, abdominal pain, diarrhea, constipation. Of note, she notes a erythematous painful rash under her breast and in her pelvic area that she believes worsened over the past two days. Denies any oozing from the rash. She drinks about 6-7 cans of beer daily for as long as she can remember and does not believe she has ever undergone withdrawal. Currently denying any bowel or bladder incontinence, though endorsing some  numbness in her upper legs and lower legs. States that she cannot walk on her legs since being stuck in the bathroom.    Weakness.   CT head negative for acute process, with patchy areas of hypodensity within the periventricular white matter which is nonspecific and may represent sequela small vessel ischemic disease in this patient.  MRI lumbar spine:   Right sided disc herniation at L4-5 displaces the right L5 root as discussed above and may explain a radiculopathy.  No significant spinal stenosis. Other level disc degeneration includes disc bulges and an annular fissure at L3-4. Filum is fatty but without a tethered cord.  - B12 borderline low   - Folate low   - homocysteine elevated, pending methymalonic acid laboratory for further workup  - Received thiamine 500mg IV TID for 5 days  - Folate supplementation  - B12 injection: to receive 100 mcg subq 05/01, 05/08, 05/15, 05/22 , then care be transitioned to oral   Also with possible radiculopathy:   Right sided disc herniation at L4-5 displaces the right L5 root as discussed above and may explain a radiculopathy.  No significant spinal stenosis. Other level disc degeneration includes disc bulges and an annular fissure at L3-4. Filum is fatty but without a tethered cord.  Ortho was consulted and she will follow up outpatient with Dr. Humphries.     UTI (urinary tract infection).   e coli sensitive to CTX, treated for three days with resolution of symptoms.     Fungal Rash  - patient presented with erythematous rash under breast and in groin, likely 2/2 fungal etiology.  -continue with nystatin powder and clotrimazole cream.      Mild HTN.   ·  Plan: Well controlled on current regimen.   - c/w amlodipine 5mg daily.      PAD (peripheral artery disease).   Aspirin restarted, she had previously been started on atorvastatin 40 mg in the past but was lost to follow up. She should be restarted at her PCP appointment.   - Should also have referral for Dr. Conn - vascular.   - resume atorvastatin at outpatient follow up     AA (alcohol abuse).   ·  Plan: Drinks 6-7 cans of beer "for as long as she can remember." As per patient, she has no recollection of ever withdrawing from alcohol; however, does not believe she has abstained for more than 3-4 days.  - NTD other than  on alcohol cessation. Did not require Ativan throughout first days of hospitalization.    Macrocytosis.   ·  Plan: -likely 2/2 etoh use.  Plan:   - b12   - folate supplementation.    Arthritis.   ·  Plan: Patient with history of spine arthritis, reliant on walker.   -tylenol for pain  -lidocaine patch if needed.  -pt/ot as above.    Physical exam at discharge:  PHYSICAL EXAM  General: NAD, pleasant  Head: NC/AT; MMM; PERRL; EOMI w/ conjugate gaze deviation of L eye   Neck: Supple; no JVD  Respiratory: CTAB; no wheezes/rales/rhonchi  Cardiovascular: Regular rhythm/rate; S1/S2+, no murmurs, rubs gallops   Gastrointestinal: Soft; slightly distended and tender + tympanitic; bowel sounds normal and present  Extremities: WWP; no edema/cyanosis  Neurological: A&Ox3, no obvious focal deficits  Skin: Fungal rash under breasts and in intertriginous areas     New medications on discharge: Vitamin B12 injections  Labs to be followed up: methylmalonic acid   Imaging to be done as outpatient: None  Further outpatient workup: Regular follow up care, ortho follow up, vascular follow up    Ms. Parsons is a 63 year old female with medical history significant for PAD, alcohol use disorder, and degenerative arthritis in her spine, former smoker (quit 3.2020) coming in from her home by EMS due to inability to care for herself at home. She has been feeling increasingly weak for the past week, and as such, was reportedly sitting on the toilet for two days. She says she went to the toilet on Tuesday afternoon, and left her walker which she is reliant on, in the hallway. She then was too weak and could not ambulate to get up, and as such as been in place without food or water for two days. Her , who lives in a nursing home, had not heard from her for two days, and called EMS, who found her at her home. . Pt denies head injury or fall, denies cough, cold, SOB, CP, abdominal pain, diarrhea, constipation. Of note, she notes a erythematous painful rash under her breast and in her pelvic area that she believes worsened over the past two days. Denies any oozing from the rash. She drinks about 6-7 cans of beer daily for as long as she can remember and does not believe she has ever undergone withdrawal. Currently denying any bowel or bladder incontinence, though endorsing some  numbness in her upper legs and lower legs. States that she cannot walk on her legs since being stuck in the bathroom.    Weakness.   CT head negative for acute process, with patchy areas of hypodensity within the periventricular white matter which is nonspecific and may represent sequela small vessel ischemic disease in this patient.  MRI lumbar spine:   Right sided disc herniation at L4-5 displaces the right L5 root as discussed above and may explain a radiculopathy.  No significant spinal stenosis. Other level disc degeneration includes disc bulges and an annular fissure at L3-4. Filum is fatty but without a tethered cord.  - B12 borderline low   - Folate low   - homocysteine elevated, pending methylmalonic acid laboratory for further workup  - Received thiamine 500mg IV TID for 5 days  - Folate supplementation  - B12 injection: to receive 100 mcg subq 05/01, 05/08, 05/15, 05/22 , then care be transitioned to oral   Also with possible radiculopathy:   Right sided disc herniation at L4-5 displaces the right L5 root as discussed above and may explain a radiculopathy.  No significant spinal stenosis. Other level disc degeneration includes disc bulges and an annular fissure at L3-4. Filum is fatty but without a tethered cord.  Ortho was consulted and she will follow up outpatient with Dr. Humphries.     UTI (urinary tract infection).   e coli sensitive to CTX, treated for three days with resolution of symptoms.     Fungal Rash  - patient presented with erythematous rash under breast and in groin, likely 2/2 fungal etiology.  -continue with nystatin powder and clotrimazole cream.      Mild HTN.   ·  Plan: Well controlled on current regimen.   - c/w amlodipine 5mg daily.      PAD (peripheral artery disease).   Aspirin restarted, she had previously been started on atorvastatin 40 mg in the past but was lost to follow up. She should be restarted at her PCP appointment.   - Should also have referral for Dr. Conn - vascular.   - resume atorvastatin at outpatient follow up     AA (alcohol abuse).   ·  Plan: Drinks 6-7 cans of beer "for as long as she can remember." As per patient, she has no recollection of ever withdrawing from alcohol; however, does not believe she has abstained for more than 3-4 days.  - NTD other than  on alcohol cessation. Did not require Ativan throughout first days of hospitalization.    Macrocytosis.   ·  Plan: -likely 2/2 etoh use.  Plan:   - b12   - folate supplementation.    Arthritis.   ·  Plan: Patient with history of spine arthritis, reliant on walker.   -tylenol for pain  -lidocaine patch if needed.  -pt/ot as above.    Physical exam at discharge:  PHYSICAL EXAM  General: NAD, pleasant  Head: NC/AT; MMM; PERRL; EOMI w/ conjugate gaze deviation of L eye   Neck: Supple; no JVD  Respiratory: CTAB; no wheezes/rales/rhonchi  Cardiovascular: Regular rhythm/rate; S1/S2+, no murmurs, rubs gallops   Gastrointestinal: Soft; slightly distended and tender + tympanitic; bowel sounds normal and present  Extremities: WWP; no edema/cyanosis  Neurological: A&Ox3, no obvious focal deficits  Skin: Fungal rash under breasts and in intertriginous areas     New medications on discharge: Vitamin B12 injections  Labs to be followed up: methylmalonic acid   Imaging to be done as outpatient: None  Further outpatient workup: Regular follow up care, ortho follow up, vascular follow up    Ms. Parsons is a 63 year old female with medical history significant for PAD, alcohol use disorder, and degenerative arthritis in her spine, former smoker (quit 3.2020) coming in from her home by EMS due to inability to care for herself at home. She has been feeling increasingly weak for the past week, and as such, was reportedly sitting on the toilet for two days. She says she went to the toilet on Tuesday afternoon, and left her walker which she is reliant on, in the hallway. She then was too weak and could not ambulate to get up, and as such as been in place without food or water for two days. Her , who lives in a nursing home, had not heard from her for two days, and called EMS, who found her at her home. . Pt denies head injury or fall, denies cough, cold, SOB, CP, abdominal pain, diarrhea, constipation. Of note, she notes a erythematous painful rash under her breast and in her pelvic area that she believes worsened over the past two days. Denies any oozing from the rash. She drinks about 6-7 cans of beer daily for as long as she can remember and does not believe she has ever undergone withdrawal. Currently denying any bowel or bladder incontinence, though endorsing some  numbness in her upper legs and lower legs. States that she cannot walk on her legs since being stuck in the bathroom.    Weakness.   CT head negative for acute process, with patchy areas of hypodensity within the periventricular white matter which is nonspecific and may represent sequela small vessel ischemic disease in this patient.  MRI lumbar spine:   Right sided disc herniation at L4-5 displaces the right L5 root as discussed above and may explain a radiculopathy.  No significant spinal stenosis. Other level disc degeneration includes disc bulges and an annular fissure at L3-4. Filum is fatty but without a tethered cord.  - B12 borderline low   - Folate low   - homocysteine elevated, methylmalonic acid elevated   - Received thiamine 500mg IV TID for 5 days  - Folate supplementation  - B12 injection: to receive 100 mcg subq 05/01, 05/08, 05/15, 05/22 , then care be transitioned to oral   Also with possible radiculopathy:   Right sided disc herniation at L4-5 displaces the right L5 root as discussed above and may explain a radiculopathy.  No significant spinal stenosis. Other level disc degeneration includes disc bulges and an annular fissure at L3-4. Filum is fatty but without a tethered cord.  Ortho was consulted and she will follow up outpatient with Dr. Humphries.     UTI (urinary tract infection).   e coli sensitive to CTX, treated for three days with resolution of symptoms.     Fungal Rash  - patient presented with erythematous rash under breast and in groin, likely 2/2 fungal etiology.  -continue with nystatin powder and clotrimazole cream.      Mild HTN.   ·  Plan: Well controlled on current regimen.   - c/w amlodipine 5mg daily.      PAD (peripheral artery disease).   Aspirin restarted, she had previously been started on atorvastatin 40 mg in the past but was lost to follow up. She should be restarted at her PCP appointment.   - Should also have referral for Dr. Conn - vascular.   - resume atorvastatin at outpatient follow up     AA (alcohol abuse).   ·  Plan: Drinks 6-7 cans of beer "for as long as she can remember." As per patient, she has no recollection of ever withdrawing from alcohol; however, does not believe she has abstained for more than 3-4 days.  - NTD other than  on alcohol cessation. Did not require Ativan throughout first days of hospitalization.    Macrocytosis.   ·  Plan: -likely 2/2 etoh use.  Plan:   - b12   - folate supplementation.    Arthritis.   ·  Plan: Patient with history of spine arthritis, reliant on walker.   -tylenol for pain  -lidocaine patch if needed.  -pt/ot as above.    Physical exam at discharge:  PHYSICAL EXAM  General: NAD, pleasant  Head: NC/AT; MMM; PERRL; EOMI w/ conjugate gaze deviation of L eye   Neck: Supple; no JVD  Respiratory: CTAB; no wheezes/rales/rhonchi  Cardiovascular: Regular rhythm/rate; S1/S2+, no murmurs, rubs gallops   Gastrointestinal: Soft; slightly distended and tender + tympanitic; bowel sounds normal and present  Extremities: WWP; no edema/cyanosis  Neurological: A&Ox3, no obvious focal deficits  Skin: Fungal rash under breasts and in intertriginous areas     New medications on discharge: Vitamin B12 injections  Labs to be followed up: methylmalonic acid   Imaging to be done as outpatient: None  Further outpatient workup: Regular follow up care, ortho follow up, vascular follow up    Ms. Parsons is a 63 year old female with medical history significant for PAD, alcohol use disorder, and degenerative arthritis in her spine, former smoker (quit 3.2020) coming in from her home by EMS due to inability to care for herself at home. She has been feeling increasingly weak for the past week, and as such, was reportedly sitting on the toilet for two days. She says she went to the toilet on Tuesday afternoon, and left her walker which she is reliant on, in the hallway. She then was too weak and could not ambulate to get up, and as such as been in place without food or water for two days. Her , who lives in a nursing home, had not heard from her for two days, and called EMS, who found her at her home. . Pt denies head injury or fall, denies cough, cold, SOB, CP, abdominal pain, diarrhea, constipation. Of note, she notes a erythematous painful rash under her breast and in her pelvic area that she believes worsened over the past two days. Denies any oozing from the rash. She drinks about 6-7 cans of beer daily for as long as she can remember and does not believe she has ever undergone withdrawal. Currently denying any bowel or bladder incontinence, though endorsing some  numbness in her upper legs and lower legs. States that she cannot walk on her legs since being stuck in the bathroom.    Weakness.   CT head negative for acute process, with patchy areas of hypodensity within the periventricular white matter which is nonspecific and may represent sequela small vessel ischemic disease in this patient.  MRI lumbar spine:   Right sided disc herniation at L4-5 displaces the right L5 root as discussed above and may explain a radiculopathy.  No significant spinal stenosis. Other level disc degeneration includes disc bulges and an annular fissure at L3-4. Filum is fatty but without a tethered cord.  - B12 borderline low   - Folate low   - homocysteine elevated, methylmalonic acid elevated   - Received thiamine 500mg IV TID for 5 days  - Folate supplementation  - B12 injection: to receive 100 mcg subq 05/01, 05/08, 05/15, 05/22 , then care be transitioned to oral   Also with possible radiculopathy:   Right sided disc herniation at L4-5 displaces the right L5 root as discussed above and may explain a radiculopathy.  No significant spinal stenosis. Other level disc degeneration includes disc bulges and an annular fissure at L3-4. Filum is fatty but without a tethered cord.  Ortho was consulted and she will follow up outpatient with Dr. Humphries.     UTI (urinary tract infection).   e coli sensitive to CTX, treated for three days with resolution of symptoms.     Fungal Rash  - patient presented with erythematous rash under breast and in groin, likely 2/2 fungal etiology.  -continue with nystatin powder and clotrimazole cream.      Mild HTN.   ·  Plan: Well controlled on current regimen.   - c/w amlodipine 5mg daily.      PAD (peripheral artery disease).   Aspirin restarted, she had previously been started on atorvastatin 40 mg in the past but was lost to follow up. She should be restarted at her PCP appointment.   - Should also have referral for Dr. Conn - vascular.   - resume atorvastatin at outpatient follow up     AA (alcohol abuse).   ·  Plan: Drinks 6-7 cans of beer "for as long as she can remember." As per patient, she has no recollection of ever withdrawing from alcohol; however, does not believe she has abstained for more than 3-4 days.  - NTD other than  on alcohol cessation. Did not require Ativan throughout first days of hospitalization.    Macrocytosis.   ·  Plan: -likely 2/2 etoh use.  Plan:   - b12   - folate supplementation.    Arthritis.   ·  Plan: Patient with history of spine arthritis, reliant on walker.   -tylenol for pain  -lidocaine patch if needed.  -pt/ot as above.    Physical exam at discharge:  PHYSICAL EXAM  General: NAD, pleasant  Head: NC/AT; MMM; PERRL; EOMI w/ conjugate gaze deviation of L eye   Neck: Supple; no JVD  Respiratory: CTAB; no wheezes/rales/rhonchi  Cardiovascular: Regular rhythm/rate; S1/S2+, no murmurs, rubs gallops   Gastrointestinal: Soft; slightly distended and tender + tympanitic; bowel sounds normal and present  Extremities: WWP; no edema/cyanosis  Neurological: A&Ox3, no obvious focal deficits  Skin: Fungal rash under breasts and in intertriginous areas     New medications on discharge: Vitamin B12 injections   , multivitamin  Labs to be followed up: repeat B12, folate testing   Imaging to be done as outpatient: None  Further outpatient workup: Regular follow up care, ortho follow up, vascular follow up     ******************************************************  ATTENDING ATTESTATION  Patient seen and discussed with resident team on the day of discharge.     63 year old woman with PAD, alcohol use disorder, degenerative arthritis of the spine, former smoker, presenting from home with weakness (inability to get up from toilet)  . Also treated for urinary tract infection this admission. Also found to have B12 and folate deficiency. MRI lumbar spine showing disc herniation at L4-L5. Per ortho-spine consult, no acute intervention warranted.   Can follow up with ortho as outpatient.     # Weakness - Weakness likely multifactorial, B12 deficiency and L4-L5 herniation possibly contributory; f/u neurology. Counseled on reasons to return to ED   # Vitamin B12 deficiency - low-normal serum B12 with elevated MMA . treat with subq B12 injections, followed by oral supplements [ ] follow up outpatient for pernicious anemia workup if consistent with goals of care   # Folate deficiency - start folate supplements, MVI   # L4-L5 Herniation displacing right L5 Root  - Per ortho-spine consult, no acute intervention warranted. Can follow up with ortho as outpatient. Counseled on reasons to return to ED  # UTI - treated   # PAD - hx of fem pop bypass ; counseled on need to resume follow up with vascular clinic     I was physically present for the evaluation and management services provided. I spent > 30 minutes on direct patient care and discharge planning with more than 50% of the visit spent on counseling and/or coordination of care.

## 2023-04-27 NOTE — PROGRESS NOTE ADULT - PROVIDER SPECIALTY LIST ADULT
Internal Medicine
Neurology
Internal Medicine
Neurology
Rehab Medicine
Hospitalist
Internal Medicine
Neurology
Internal Medicine

## 2023-04-27 NOTE — DISCHARGE NOTE PROVIDER - NSDCCPCAREPLAN_GEN_ALL_CORE_FT
Encounter Date: 11/29/2017       History     Chief Complaint   Patient presents with    Fever     Mother reports pt's fever began this morning of 102 axillary and has productive cough of 1 week, pt given tylenol at 1700. Mother denies n/v.      1 y.o. male presents with fever to 102 that started this morning.   Had a cough this morning that has since resolved (after he was given a single neb), no SOB. No vomiting.  Drinking well, no apparent pain.  No rash.  Normal uo.    About 10 days ago was seen in clinic for URI and treated for wheezing and otitis.  Currently still taking amox for the OM.  The URI Sx and wheezing had resolved.    No known ill contacts.    PMH premie, cldp (chart reviewed)  Meds amox  nkda  UTD  Received flu shot a couple weeks ago (chart review, received in 10/17)      The history is provided by the mother. The history is limited by a language barrier. A  was used.     Review of patient's allergies indicates:  No Known Allergies  Past Medical History:   Diagnosis Date    Adrenal insufficiency     resolved after hydrocortisone taper    Apnea of prematurity     ASD (atrial septal defect)     Chronic lung disease of prematurity 2015    History of prolonged intubation and mechanical ventilation, including jet. On ventilatory support until 2015 and then again briefly on 2015-2015 for gastrostomy placement. NIPPV support completed on 7/13/15. Low flow nasal cannula since 2015. Will be discharged home on 1L nasal cannula at 100%. Completed two prolonged courses of dexamethasone 2015-2015.  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     Hypoxemia     Klebsiella pneumonia     PDA (patent ductus arteriosus)     S/P repair of PDA (patent ductus arteriosus) 2015    Snoring     Wheezing-associated respiratory infection      Past  Surgical History:   Procedure Laterality Date    CENTRAL VENOUS CATHETER INSERTION      GASTROSTOMY TUBE PLACEMENT  8/17/15    NISSEN FUNDOPLICATION  8/17/15    PATENT DUCTUS ARTERIOUS LIGATION  5/12/15     Family History   Problem Relation Age of Onset    No Known Problems Mother     No Known Problems Father     Congenital heart disease Neg Hx     Early death Neg Hx     Pacemaker/defibrilator Neg Hx      Social History   Substance Use Topics    Smoking status: Never Smoker    Smokeless tobacco: Never Used    Alcohol use No     Review of Systems   Constitutional: Positive for fever. Negative for activity change and appetite change.   HENT: Negative for congestion, rhinorrhea and sore throat.    Eyes: Negative for discharge and redness.   Respiratory: Positive for cough. Negative for wheezing.    Cardiovascular: Negative for chest pain.   Gastrointestinal: Negative for abdominal pain, diarrhea, nausea and vomiting.   Genitourinary: Negative for decreased urine volume, difficulty urinating, dysuria, frequency and hematuria.   Musculoskeletal: Negative for arthralgias, joint swelling and myalgias.   Skin: Negative for rash.   Neurological: Negative for headaches.   Hematological: Does not bruise/bleed easily.       Physical Exam     Initial Vitals [11/29/17 2148]   BP Pulse Resp Temp SpO2   -- (!) 134 22 99.8 °F (37.7 °C) 95 %      MAP       --         Physical Exam    Nursing note and vitals reviewed.  Constitutional: He appears well-developed and well-nourished. He is active. No distress.   HENT:   Mouth/Throat: Mucous membranes are moist. No tonsillar exudate. Oropharynx is clear. Pharynx is normal.   Both TM;s bulging, purulent fluid.   Eyes: Conjunctivae are normal. Pupils are equal, round, and reactive to light. Right eye exhibits no discharge. Left eye exhibits no discharge.   Neck: Normal range of motion. Neck supple. No neck adenopathy.   Cardiovascular: Normal rate and regular rhythm. Pulses are  strong.    No murmur heard.  Pulmonary/Chest: Effort normal and breath sounds normal. No respiratory distress. He has no wheezes. He has no rales. He exhibits no retraction.   Transmitted UA sounds, lungs clear, good air mvmt, no retractions.   Abdominal: Soft. Bowel sounds are normal. He exhibits no distension and no mass. There is no tenderness.   Musculoskeletal: He exhibits no edema or deformity.   Neurological: He is alert. No cranial nerve deficit.   Skin: Skin is warm and dry. Capillary refill takes less than 2 seconds. No rash noted. No cyanosis.         ED Course   Procedures  Labs Reviewed   POCT INFLUENZA A/B             Medical Decision Making:   History:   I obtained history from: someone other than patient.  Old Medical Records: I decided to obtain old medical records.  Initial Assessment:   Fever viral illness otitis.  Differential Diagnosis:   Febrile illness in young child appears consistent with viral illness and otitis  Differential dx considered also included Meningitis, pneumonia, sepsis, uti otitis pharyngitis, URI, Kawasaki.  No evidence of emergency medical condition at this time.  Clinical Tests:   Lab Tests: Ordered and Reviewed       <> Summary of Lab: flu  ED Management:     Reviewed with parent symptomatic care, medication use (change to cefdinir, discontinue amox), expected course, follow up, need for reevaluation if fever continues more than 3 more days, recommendations and indications for urgent return                   ED Course      Clinical Impression:   The primary encounter diagnosis was Acute febrile illness in child. A diagnosis of Acute suppurative otitis media of both ears without spontaneous rupture of tympanic membranes, recurrence not specified was also pertinent to this visit.    Disposition:   Disposition: Discharged  Condition: Stable                        Danna Hollingsworth MD  12/02/17 1394     PRINCIPAL DISCHARGE DIAGNOSIS  Diagnosis: Weakness  Assessment and Plan of Treatment: You were hospitalized for weakness. This can be caused by many things including deconditioning, infection, and many other causes. We treated your urinary tract infection, we also gave you vitamins which help with your strength. We obtained imaging of your head and spine to make sure that there was nothing causing your weakness that could be seen on imaging. We also had physical therapy work with you to help you get stronger. You will continue to work with physical therapy.      SECONDARY DISCHARGE DIAGNOSES  Diagnosis: UTI (urinary tract infection)  Assessment and Plan of Treatment: Urinary tract infections, also called "UTIs," are infections that affect either the bladder or the kidneys. Bladder infections are more common than kidney infections. Bladder infections happen when bacteria get into the urethra and travel up into the bladder. Kidney infections happen when the bacteria travel even higher, up into the kidneys. The symptoms of a bladder infection include pain or a burning feeling when you urinate, the need to urinate often, the need to urinate suddenly or in a hurry, blood in the urine. Signs that an infection has spread to the kidneys include fever, back pain, or nausea/vomiting. It is important that you take your antibiotics as prescribed and to completion to properly treat your urinary tract infection and prevent antibiotic resistance.       PRINCIPAL DISCHARGE DIAGNOSIS  Diagnosis: Weakness  Assessment and Plan of Treatment: You were hospitalized for sudden weakness. This can be caused by many things including deconditioning, infection, and many other causes. We treated your urinary tract infection, we also gave you vitamins which help with your strength. We obtained imaging of your head and spine to make sure that there was nothing causing your weakness that could be seen on imaging. You have a herniated disk that could be the cause of weakness in your right leg. We had orthopedics evaluate you and they would like you to follow up with them in the outpatient setting. We also had physical therapy work with you to help you get stronger. You will continue to work with physical therapy.      SECONDARY DISCHARGE DIAGNOSES  Diagnosis: UTI (urinary tract infection)  Assessment and Plan of Treatment: Urinary tract infections, also called "UTIs," are infections that affect either the bladder or the kidneys. Bladder infections are more common than kidney infections. Bladder infections happen when bacteria get into the urethra and travel up into the bladder. Kidney infections happen when the bacteria travel even higher, up into the kidneys. The symptoms of a bladder infection include pain or a burning feeling when you urinate, the need to urinate often, the need to urinate suddenly or in a hurry, blood in the urine. Signs that an infection has spread to the kidneys include fever, back pain, or nausea/vomiting. It is important that you take your antibiotics as prescribed and to completion to properly treat your urinary tract infection and prevent antibiotic resistance.

## 2023-04-27 NOTE — DISCHARGE NOTE PROVIDER - CARE PROVIDER_API CALL
Saritha Gonzalez (DO)  Internal Medicine  [address] [address]  Phone: [phone]  Fax: [phone]  Follow Up Time: 1 week   Saritha Gonzalez (DO)  Internal Medicine  [address] [address]  Phone: [phone]  Fax: [phone]  Follow Up Time: 1 week    Prasanna Humphries (MD)  Orthopedics  [address] [address]  Phone: [phone]  Fax: [phone]  Follow Up Time: 2 weeks

## 2023-04-27 NOTE — DISCHARGE NOTE PROVIDER - NSDCFUSCHEDAPPT_GEN_ALL_CORE_FT
[facility]  INTMED [address]  Scheduled Appointment: 05/08/2023

## 2023-04-27 NOTE — PROGRESS NOTE ADULT - SUBJECTIVE AND OBJECTIVE BOX
O/N Events: naeo    Subjective/ROS: Patient seen and examined at bedside, no acute changes from yesterday. Rash improving, still pending AJ. Denies fevers, chills, HA, CP, SOB, n/v, changes in bowel/urinary habits.  12pt ROS otherwise negative.    VITALS  Vital Signs Last 24 Hrs  T(C): 36.8 (27 Apr 2023 11:18), Max: 36.8 (27 Apr 2023 11:18)  T(F): 98.3 (27 Apr 2023 11:18), Max: 98.3 (27 Apr 2023 11:18)  HR: 72 (27 Apr 2023 11:18) (72 - 84)  BP: 106/56 (27 Apr 2023 11:18) (106/56 - 137/76)  BP(mean): --  RR: 17 (27 Apr 2023 11:18) (16 - 17)  SpO2: 96% (27 Apr 2023 11:18) (96% - 97%)    Parameters below as of 27 Apr 2023 11:18  Patient On (Oxygen Delivery Method): room air        CAPILLARY BLOOD GLUCOSE          PHYSICAL EXAM  General: NAD, pleasant  Head: NC/AT; MMM; PERRL; EOMI w/ conjugate gaze deviation of L eye   Neck: Supple; no JVD  Respiratory: CTAB; no wheezes/rales/rhonchi  Cardiovascular: Regular rhythm/rate; S1/S2+, no murmurs, rubs gallops   Gastrointestinal: Soft; slightly distended and tender + tympanitic; bowel sounds normal and present  Extremities: WWP; no edema/cyanosis  Neurological: A&Ox3, no obvious focal deficits    MEDICATIONS  (STANDING):  amLODIPine   Tablet 5 milliGRAM(s) Oral daily  aspirin enteric coated 81 milliGRAM(s) Oral daily  bisacodyl Suppository 10 milliGRAM(s) Rectal daily  clotrimazole 1% Cream 1 Application(s) Topical two times a day  enoxaparin Injectable 40 milliGRAM(s) SubCutaneous every 24 hours  folic acid 1 milliGRAM(s) Oral daily  multivitamin 1 Tablet(s) Oral daily  polyethylene glycol 3350 17 Gram(s) Oral daily  senna 2 Tablet(s) Oral at bedtime    MEDICATIONS  (PRN):  acetaminophen     Tablet .. 650 milliGRAM(s) Oral every 6 hours PRN Temp greater or equal to 38C (100.4F), Mild Pain (1 - 3)      No Known Allergies      LABS                      IMAGING/EKG/ETC

## 2023-04-27 NOTE — PROGRESS NOTE ADULT - PROBLEM SELECTOR PROBLEM 4
The patient is Stable - Low risk of patient condition declining or worsening    Shift Goals  Clinical Goals: control pain  Patient Goals: pain control, family bonding    Problem: Infection - Postpartum  Goal: Postpartum patient will be free of signs and symptoms of infection  Outcome: Progressing  Note: Patient remains free from signs and symptoms of infection, vital signs stable.       Problem: Respiratory/Oxygenation Function Post-Surgical  Goal: Patient will achieve/maintain normal respiratory rate/effort  Outcome: Progressing  Note: Patient maintains normal respiratory rate and effort.           PAD (peripheral artery disease)

## 2023-04-27 NOTE — PROGRESS NOTE ADULT - ATTENDING COMMENTS
63F w PAD EtOH use (6-7 cans/d) BIBEMS for weakness and being stuck on the toilet reportedly for 2 days found to have ELIDA, UTI    -UTI - ctx x 3 days  -etoh abuse - likely out of window of withdrawal but c/w CIWAs, currently 0   -asymmetric LE weakness - R > L, neuro following, appreciate recs, pending MRI lumbar and a variety of other labs including b12, folate, spep, upep, tsh, esr, crp, rpr, c-ANCA, p-ANCA, b1, mma, homocysteine, hcv, hbv   -ELIDA - resolved, likely pre-renal   -hx PAD - restart aspirin, statin  -elevated BPs - amlodipine started inpatient. monitor BP  -abnormal lfts - slightly elevated bili on admission, all lfts normalized, RUQ U/s - cholelithiasis w/o cholecystitis, repeat LFTs if new RUQ pain    dvt ppx lovenox    dispo; AJ
lab significant for low folate and borderline B12. clinical symptoms and exam unchanged.  possible new lumbar radiculopathy in addition to contribution from malnutrition    pending MRI L spine w/o contrast  would supplement both folate and b12 aggressively.   PT/OT
I was physically present for the key portions of the evaluation and managemnent (E/M) service provided.  I agree with the above history, physical, and plan which I have reviewed and edited where appropriate, with the exceptions as per my note.    pt seen and examined 4/26. she reported substantial improvement in strength.    neuro exam with evidence of peripheral neuropathy, likely 2/2 etoh use, low b12 level, and nutritional deficiency.     MRI L spine reviewed and may explain radiculopathy, but clinical exam more consistent with peripheral neuropathy.    Pt should f/u with outpatient neurology for EMG with Dr. Casanova or Dr. Diaz.
#FTT  #alcohol use disorder  #b12 def w macrocytic anemia  # elevated homocysteine levels    - labs remain stable  - no further complaints  - plan for re-eval w PT today. plan for AJ sprague started 4/26
#FTT  #alcohol use disorder  #b12 def w macrocytic anemia  # elevated homocysteine levels    - labs remain stable  - no further complaints  - plan for re-eval w PT today. plan for AJ to [facility]; auth started 4/25
63F w PAD EtOH use (6-7 cans/d) BIBEMS for weakness and being stuck on the toilet reportedly for 2 days found to have ELIDA, UTI    -UTI - ctx x 3 days  -etoh abuse - likely out of window of withdrawal but c/w CIWAs, currently 0   -asymmetric LE weakness - R > L, neuro following, appreciate recs, pending MRI lumbar and a variety of other labs including b12, folate, spep, upep, tsh, esr, crp, rpr, c-ANCA, p-ANCA, b1, mma, homocysteine, hcv, hbv   -ELIDA - resolved, likely pre-renal   -hx PAD - restart aspirin, statin  -elevated BPs - amlodipine started inpatient. monitor BP  -abnormal lfts - slightly elevated bili on admission, all lfts normalized, RUQ U/s - cholelithiasis w/o cholecystitis, repeat LFTs if new RUQ pain    dvt ppx lovenox    dispo; AJ
#UTI  #macrocytic anemia with elevated homocysteine level, b12 deficiency  #alcohol use disorder    - completed antibx today, ucx reviewed +Ecoli  - b12 supplementation as needed, c/w thiamine, Multivitamin  - CIWA low  -MR brain as per neuro  - labs reviewed    plan for AJ to [facility], auth pending
late note entry    #FTT  #alcohol use disorder  #b12 def w macrocytic anemia  # elevated homocysteine levels    - labs remain stable  - no further complaints  - plan for re-eval w PT today. plan for AJ sprague started 4/26.

## 2023-04-27 NOTE — PROGRESS NOTE ADULT - PROBLEM SELECTOR PLAN 1
DDx includes alcohol neuropathy, B12 neuropathy, deconditioning.     Plan:   - CT head negative for acute process  - MRI lumbar spine w/ R sided disk herniation   - Vitamin B12 injections  - folate   - PT recs AJ

## 2023-04-27 NOTE — PROGRESS NOTE ADULT - NUTRITIONAL ASSESSMENT
This patient has been assessed with a concern for Malnutrition and has been determined to have a diagnosis/diagnoses of Mild protein-calorie malnutrition.    This patient is being managed with:   Diet Regular-  Entered: Apr 21 2023  3:13AM  

## 2023-04-27 NOTE — DISCHARGE NOTE PROVIDER - DETAILS OF MALNUTRITION DIAGNOSIS/DIAGNOSES
This patient has been assessed with a concern for Malnutrition and was treated during this hospitalization for the following Nutrition diagnosis/diagnoses:     -  04/23/2023: Mild protein-calorie malnutrition

## 2023-04-27 NOTE — PROVIDER CONTACT NOTE (CHANGE IN STATUS NOTIFICATION) - SITUATION
pt resting in bed c/o heart burn and describes it as chest pain 6/10 further described as a tightness feeling.

## 2023-04-28 ENCOUNTER — TRANSCRIPTION ENCOUNTER (OUTPATIENT)
Age: 63
End: 2023-04-28

## 2023-04-28 VITALS
TEMPERATURE: 98 F | HEART RATE: 87 BPM | DIASTOLIC BLOOD PRESSURE: 68 MMHG | RESPIRATION RATE: 16 BRPM | SYSTOLIC BLOOD PRESSURE: 128 MMHG | OXYGEN SATURATION: 96 %

## 2023-04-28 PROBLEM — M19.90 UNSPECIFIED OSTEOARTHRITIS, UNSPECIFIED SITE: Chronic | Status: ACTIVE | Noted: 2023-04-21

## 2023-04-28 PROBLEM — I73.9 PERIPHERAL VASCULAR DISEASE, UNSPECIFIED: Chronic | Status: ACTIVE | Noted: 2023-04-21

## 2023-04-28 PROBLEM — Z87.891 PERSONAL HISTORY OF NICOTINE DEPENDENCE: Chronic | Status: ACTIVE | Noted: 2023-04-21

## 2023-04-28 LAB
METHYLMALONATE SERPL-SCNC: 1194 NMOL/L — HIGH (ref 0–378)
SARS-COV-2 RNA SPEC QL NAA+PROBE: SIGNIFICANT CHANGE UP

## 2023-04-28 PROCEDURE — 87186 SC STD MICRODIL/AGAR DIL: CPT

## 2023-04-28 PROCEDURE — 97535 SELF CARE MNGMENT TRAINING: CPT

## 2023-04-28 PROCEDURE — 87340 HEPATITIS B SURFACE AG IA: CPT

## 2023-04-28 PROCEDURE — 81001 URINALYSIS AUTO W/SCOPE: CPT

## 2023-04-28 PROCEDURE — 36415 COLL VENOUS BLD VENIPUNCTURE: CPT

## 2023-04-28 PROCEDURE — 86709 HEPATITIS A IGM ANTIBODY: CPT

## 2023-04-28 PROCEDURE — 85027 COMPLETE CBC AUTOMATED: CPT

## 2023-04-28 PROCEDURE — 83935 ASSAY OF URINE OSMOLALITY: CPT

## 2023-04-28 PROCEDURE — 82248 BILIRUBIN DIRECT: CPT

## 2023-04-28 PROCEDURE — 97165 OT EVAL LOW COMPLEX 30 MIN: CPT

## 2023-04-28 PROCEDURE — 86706 HEP B SURFACE ANTIBODY: CPT

## 2023-04-28 PROCEDURE — 99285 EMERGENCY DEPT VISIT HI MDM: CPT | Mod: 25

## 2023-04-28 PROCEDURE — 86140 C-REACTIVE PROTEIN: CPT

## 2023-04-28 PROCEDURE — 87635 SARS-COV-2 COVID-19 AMP PRB: CPT

## 2023-04-28 PROCEDURE — 86780 TREPONEMA PALLIDUM: CPT

## 2023-04-28 PROCEDURE — 84436 ASSAY OF TOTAL THYROXINE: CPT

## 2023-04-28 PROCEDURE — 83605 ASSAY OF LACTIC ACID: CPT

## 2023-04-28 PROCEDURE — 87086 URINE CULTURE/COLONY COUNT: CPT

## 2023-04-28 PROCEDURE — 83036 HEMOGLOBIN GLYCOSYLATED A1C: CPT

## 2023-04-28 PROCEDURE — 83921 ORGANIC ACID SINGLE QUANT: CPT

## 2023-04-28 PROCEDURE — 82550 ASSAY OF CK (CPK): CPT

## 2023-04-28 PROCEDURE — 72148 MRI LUMBAR SPINE W/O DYE: CPT

## 2023-04-28 PROCEDURE — 85025 COMPLETE CBC W/AUTO DIFF WBC: CPT

## 2023-04-28 PROCEDURE — 84300 ASSAY OF URINE SODIUM: CPT

## 2023-04-28 PROCEDURE — 86705 HEP B CORE ANTIBODY IGM: CPT

## 2023-04-28 PROCEDURE — 84155 ASSAY OF PROTEIN SERUM: CPT

## 2023-04-28 PROCEDURE — 82977 ASSAY OF GGT: CPT

## 2023-04-28 PROCEDURE — 96365 THER/PROPH/DIAG IV INF INIT: CPT

## 2023-04-28 PROCEDURE — 97110 THERAPEUTIC EXERCISES: CPT

## 2023-04-28 PROCEDURE — 84166 PROTEIN E-PHORESIS/URINE/CSF: CPT

## 2023-04-28 PROCEDURE — 82746 ASSAY OF FOLIC ACID SERUM: CPT

## 2023-04-28 PROCEDURE — 86704 HEP B CORE ANTIBODY TOTAL: CPT

## 2023-04-28 PROCEDURE — 84443 ASSAY THYROID STIM HORMONE: CPT

## 2023-04-28 PROCEDURE — 70450 CT HEAD/BRAIN W/O DYE: CPT

## 2023-04-28 PROCEDURE — 93005 ELECTROCARDIOGRAM TRACING: CPT

## 2023-04-28 PROCEDURE — 97162 PT EVAL MOD COMPLEX 30 MIN: CPT

## 2023-04-28 PROCEDURE — 86334 IMMUNOFIX E-PHORESIS SERUM: CPT

## 2023-04-28 PROCEDURE — 86036 ANCA SCREEN EACH ANTIBODY: CPT

## 2023-04-28 PROCEDURE — 87040 BLOOD CULTURE FOR BACTERIA: CPT

## 2023-04-28 PROCEDURE — 84165 PROTEIN E-PHORESIS SERUM: CPT

## 2023-04-28 PROCEDURE — 99221 1ST HOSP IP/OBS SF/LOW 40: CPT

## 2023-04-28 PROCEDURE — 83090 ASSAY OF HOMOCYSTEINE: CPT

## 2023-04-28 PROCEDURE — 86803 HEPATITIS C AB TEST: CPT

## 2023-04-28 PROCEDURE — 82607 VITAMIN B-12: CPT

## 2023-04-28 PROCEDURE — 82570 ASSAY OF URINE CREATININE: CPT

## 2023-04-28 PROCEDURE — 84100 ASSAY OF PHOSPHORUS: CPT

## 2023-04-28 PROCEDURE — 83735 ASSAY OF MAGNESIUM: CPT

## 2023-04-28 PROCEDURE — 97116 GAIT TRAINING THERAPY: CPT

## 2023-04-28 PROCEDURE — 71045 X-RAY EXAM CHEST 1 VIEW: CPT

## 2023-04-28 PROCEDURE — 82247 BILIRUBIN TOTAL: CPT

## 2023-04-28 PROCEDURE — 80053 COMPREHEN METABOLIC PANEL: CPT

## 2023-04-28 PROCEDURE — 99239 HOSP IP/OBS DSCHRG MGMT >30: CPT | Mod: GC

## 2023-04-28 PROCEDURE — 76705 ECHO EXAM OF ABDOMEN: CPT

## 2023-04-28 PROCEDURE — 80048 BASIC METABOLIC PNL TOTAL CA: CPT

## 2023-04-28 PROCEDURE — 84425 ASSAY OF VITAMIN B-1: CPT

## 2023-04-28 PROCEDURE — 86038 ANTINUCLEAR ANTIBODIES: CPT

## 2023-04-28 PROCEDURE — 83690 ASSAY OF LIPASE: CPT

## 2023-04-28 RX ORDER — FOLIC ACID 0.8 MG
1 TABLET ORAL
Qty: 0 | Refills: 0 | DISCHARGE
Start: 2023-04-28

## 2023-04-28 RX ORDER — PREGABALIN 225 MG/1
1000 CAPSULE ORAL ONCE
Refills: 0 | Status: COMPLETED | OUTPATIENT
Start: 2023-04-28 | End: 2023-04-28

## 2023-04-28 RX ADMIN — Medication 1 APPLICATION(S): at 06:35

## 2023-04-28 RX ADMIN — Medication 81 MILLIGRAM(S): at 12:09

## 2023-04-28 RX ADMIN — PREGABALIN 1000 MICROGRAM(S): 225 CAPSULE ORAL at 10:34

## 2023-04-28 RX ADMIN — Medication 1 MILLIGRAM(S): at 12:09

## 2023-04-28 RX ADMIN — Medication 1 TABLET(S): at 12:09

## 2023-04-28 NOTE — CONSULT NOTE ADULT - SUBJECTIVE AND OBJECTIVE BOX
Orthopaedic Surgery Consult Note    CC: Patient is a 64yo Female who presents with a chief complaint of weakness, UTI      HPI:  64yo Female w/ PMHx PAD, alcohol use disorder, degenerative arthritis in spine c/o weakness and BIBEMS to [facility] on 4/20 after being found on her toilet for 2 days. Patient reports going to the bathroom and was not able to get up off the toilet and had no way to contact anyone. Once her family had not heard from her they called EMS who found her on the toilet and brought her to [facility]. She was admitted for a SW eval and UTI. She reported feeling weak in her legs upon arrival and an MRI was done to investigate spinal pathology. A right-sided L4-5 disc herniation was noted and ortho was consulted. Patient reports that she has had arthritis in her spine for many years but has not been symptomatic. On this admission she has noted right sided back pain with numbness to her right leg. She states she has some mild muscle weakness but is able to ambulate with her walker. Her pain has been controlled during admission. Ambulates with a rollator at baseline. Patient is leaving for [facility] today at 4pm. Denies fever/chills, CP/SOB/N/V, urinary/bowel retention or incontinence.     ROS: Positive for back pain, right lower extremity numbness  Denies fevers, chills, headache, dizziness, chest pain, shortness of breath, nausea, vomiting.   All other systems negative, except for above.     Allergies  No Known Allergies    PAST MEDICAL & SURGICAL HISTORY:  Peripheral artery disease  Former smoker  Arthritis    Social History:  Former smoker, no drug use. (21 Apr 2023 02:30)    FAMILY HISTORY:    Medications:     Vital Signs Last 24 Hrs  T(C): 36.8 (28 Apr 2023 06:13), Max: 37.2 (27 Apr 2023 20:40)  T(F): 98.3 (28 Apr 2023 06:13), Max: 98.9 (27 Apr 2023 20:40)  HR: 85 (28 Apr 2023 06:13) (80 - 88)  BP: 134/61 (28 Apr 2023 06:13) (119/69 - 145/79)  BP(mean): --  RR: 16 (28 Apr 2023 06:13) (16 - 16)  SpO2: 95% (28 Apr 2023 06:13) (95% - 98%)    Parameters below as of 28 Apr 2023 06:13  Patient On (Oxygen Delivery Method): room air        PE:  General: Comfortable, in no acute distress  Neuro: Alert, oriented x 3  Psych: Normal mood & affect   Skin: Warm, dry, extremities well perfused, no obvious rash  MSK:    -Inspection/palpation: mildly ttp along right lumbar paraspinal muscle near L5. No ecchymosis, erythema, swelling to spine or bilateral lower extremities. No calf tenderness.     -Sensation: Decreased sensation along Right L5 dermatome, otherwise SILT bilaterally.     -Motor: EHL/FHL/TA/GS/Hip flex/ext 5/5 bilaterally    -ROM: (+) SLR to right lower extremity. FROM to bilateral hips and knees. Negative log roll bilaterally.     -Pulses: 2+ DP pulses palpable bilaterally.         Imaging: Right sided L4-5 disc herniation seen on lumbar MRI       A/P: 64yo Female w/ right-sided L4-5 disc herniation  - Imaging reviewed and case discussed with Dr. Humphries  - No acute surgical intervention required at this time  - Recommend ambulation and PT as much as tolerated, oral NSAIDs as needed for symptoms  - WBS: WBAT  - Patient may follow up with Dr. Humphries as outpatient, [phone]    
  Patient is a 63y old  Female who presents with a chief complaint of     HPI:  Ms. Parsons is a 63 year old female with medical history significant for PAD, alcohol use disorder, and degenerative arthritis in her spine, former smoker (quit 3.2020) coming in from her home by EMS due to inability to care for herself at home. She has been feeling increasingly weak for the past week, and as such, was reportedly sitting on the toilet for two days. She says she went to the toilet on Tuesday afternoon, and left her walker which she is reliant on, in the hallway. She then was too weak and could not ambulate to get up, and as such as been in place without food or water for two days. Her , who lives in a nursing home, had not heard from her for two days, and called EMS, who found her at her home. . Pt denies head injury or fall, denies cough, cold, SOB, CP, abdominal pain, diarrhea, constipation. Of note, she notes a erythematous painful rash under her breast and in her pelvic area that she believes worsened over the past two days. Denies any oozing from the rash. She drinks about 6-7 cans of beer daily for as long as she can remember and does not believe she has ever undergone withdrawal. Currently denying any bowel or bladder incontinence, though endorsing some  numbness in her upper legs and lower legs. States that she cannot walk on her legs since being stuck in the bathroom.  In the ED: VSS, Labs s/f macrocytosis, lactate 2.7 > 1.3, hyponatremic 133, BUN 24, Cr 1.26, BR 2.0, AST 49, , UA positive  EKG NSR  Course Nystatin, Tylenol, CTX, 2L Nacl     (2023 02:30)    PAST MEDICAL & SURGICAL HISTORY:  Peripheral artery disease      Former smoker      Arthritis        MEDICATIONS  (STANDING):  clotrimazole 1% Cream 1 Application(s) Topical two times a day    MEDICATIONS  (PRN):  acetaminophen     Tablet .. 650 milliGRAM(s) Oral every 6 hours PRN Temp greater or equal to 38C (100.4F), Mild Pain (1 - 3)      FAMILY HISTORY:      CBC Full  -  ( 2023 20:15 )  WBC Count : 10.36 K/uL  RBC Count : 3.87 M/uL  Hemoglobin : 14.7 g/dL  Hematocrit : 43.3 %  Platelet Count - Automated : 289 K/uL  Mean Cell Volume : 111.9 fl  Mean Cell Hemoglobin : 38.0 pg  Mean Cell Hemoglobin Concentration : 33.9 gm/dL  Auto Neutrophil # : 8.74 K/uL  Auto Lymphocyte # : 1.17 K/uL  Auto Monocyte # : 0.45 K/uL  Auto Eosinophil # : 0.00 K/uL  Auto Basophil # : 0.00 K/uL  Auto Neutrophil % : 84.4 %  Auto Lymphocyte % : 11.3 %  Auto Monocyte % : 4.3 %  Auto Eosinophil % : 0.0 %  Auto Basophil % : 0.0 %          133<L>  |  102  |  24<H>  ----------------------------<  104<H>  4.5   |  23  |  1.11    Ca    8.4      2023 00:57  Mg     2.2         TPro  x   /  Alb  x   /  TBili  1.3<H>  /  DBili  0.3  /  AST  x   /  ALT  x   /  AlkPhos  x         Urinalysis Basic - ( 2023 00:42 )    Color: Yellow / Appearance: SL Cloudy / S.025 / pH: x  Gluc: x / Ketone: Trace mg/dL  / Bili: Small / Urobili: 1.0 E.U./dL   Blood: x / Protein: 100 mg/dL / Nitrite: POSITIVE   Leuk Esterase: Small / RBC: 5-10 /HPF / WBC 5-10 /HPF   Sq Epi: x / Non Sq Epi: x / Bacteria: Present /HPF        Radiology :     < from: Xray Chest 1 View-PORTABLE IMMEDIATE (Xray Chest 1 View-PORTABLE IMMEDIATE .) (23 @ 19:56) >  ACC: [id number] EXAM:  XR CHEST PORTABLE IMMED 1V   ORDERED BY: SONA BALLESTEROS     PROCEDURE DATE:  2023          INTERPRETATION:  XR CHEST IMMEDIATE dated 2023 7:56 PM    CLINICAL INFORMATION: admission    COMPARISON: None    FINDINGS:    Unremarkable cardiomediastinal silhouette.    The lungs are clear. No pleural effusions. No pneumothorax.    No acute abnormalities of the soft tissues and osseous structures.      IMPRESSION:  No acute pathology.         REVIEW OF SYSTEMS:  per hpi         Vital Signs Last 24 Hrs  T(C): 36.8 (2023 06:14), Max: 37.5 (2023 20:20)  T(F): 98.3 (2023 06:14), Max: 99.5 (2023 20:20)  HR: 71 (2023 06:14) (71 - 110)  BP: 147/79 (2023 06:14) (147/79 - 160/93)  BP(mean): --  RR: 16 (2023 06:14) (16 - 18)  SpO2: 98% (2023 06:14) (98% - 99%)    Parameters below as of 2023 04:50  Patient On (Oxygen Delivery Method): room air            Physical Exam :  63 y o woman lying comfortably in semi Baker's position , awake , alert , no acute complaints     Head : normocephalic , atraumatic    Eyes : PERRLA , EOMI , no nystagmus , sclera anicteric    ENT : neg nasal discharge , uvula midline , no oropharyngeal erythema / exudate    Neck : supple , negative JVD , negative carotid bruits , no thyromegaly    Chest : CTA bilaterally      Cardiovascular : regular rate and rhythm , neg murmurs / rubs / gallops    Abdomen : soft , non distended , non tender to palpation in all 4 quadrants ,  negative rebound / guarding , normal bowel sounds     Extremities : WWP , neg cyanosis /clubbing / edema     Neurologic Exam :    Alert and oriented  x 3     Motor Exam:          Right UE:               no focal weakness ,  > 3+/5 throughout  , no drift                             Left UE:                 no focal weakness ,  > 3+/5 throughout  , no drift         Right LE:                no focal weakness ,  > 3+/5 throughout        Left LE:                  no focal weakness ,  > 3+/5 throughout         Sensation :         intact to light touch x 4 extremities                                DTR :                     biceps/brachioradialis : equal                              patella/ankle : equal      Gait :  not tested      PM&R Impression :     1) deconditioned    2) no focal weakness         Recommendations / Plan :      1) Physical / Occupational therapy focusing on therapeutic exercises , equipment evaluation , bed mobility/transfer out of bed evaluation , progressive ambulation with assistive devices prn .    2) Current disposition plan recommendation  :    pending functional progress      
NEUROLOGY CONSULT    HPI:  Ms. Parsons is a 63 year old female with medical history significant for PAD, alcohol use disorder, and degenerative arthritis in her spine, former smoker (quit 3.2020) coming in from her home by EMS due to inability to care for herself at home. She has been feeling increasingly weak for the past week, and as such, was reportedly sitting on the toilet for two days. She says she went to the toilet on Tuesday afternoon, and left her walker which she is reliant on, in the hallway. She then was too weak and could not ambulate to get up, and as such as been in place without food or water for two days. Her , who lives in a nursing home, had not heard from her for two days, and called EMS, who found her at her home. . Pt denies head injury or fall, denies cough, cold, SOB, CP, abdominal pain, diarrhea, constipation. Of note, she notes a erythematous painful rash under her breast and in her pelvic area that she believes worsened over the past two days. Denies any oozing from the rash. She drinks about 6-7 cans of beer daily for as long as she can remember and does not believe she has ever undergone withdrawal. Currently denying any bowel or bladder incontinence, though endorsing some  numbness in her upper legs and lower legs. States that she cannot walk on her legs since being stuck in the bathroom.  In the ED: VSS, Labs s/f macrocytosis, lactate 2.7 > 1.3, hyponatremic 133, BUN 24, Cr 1.26, BR 2.0, AST 49, , UA positive  EKG NSR  Course Nystatin, Tylenol, CTX, 2L Nacl     (2023 02:30)     MEDICATIONS  Home Medications:    MEDICATIONS  (STANDING):  amLODIPine   Tablet 5 milliGRAM(s) Oral daily  clotrimazole 1% Cream 1 Application(s) Topical two times a day  folic acid 1 milliGRAM(s) Oral daily  multivitamin 1 Tablet(s) Oral daily  thiamine IVPB 500 milliGRAM(s) IV Intermittent every 8 hours    MEDICATIONS  (PRN):  acetaminophen     Tablet .. 650 milliGRAM(s) Oral every 6 hours PRN Temp greater or equal to 38C (100.4F), Mild Pain (1 - 3)      FAMILY HISTORY:    SOCIAL HISTORY: negative for tobacco, alcohol, or ilicit drug use.    Allergies    No Known Allergies    Intolerances        GEN: NAD, pleasant, cooperative    NEURO:   MENTAL STATUS: AAOx3  LANG/SPEECH: Fluent, intact naming, repetition & comprehension  CRANIAL NERVES:  II: Pupils equal and reactive, no RAPD, normal visual field and fundus  III, IV, VI: EOM intact, no gaze preference or deviation  V: normal  VII: no facial asymmetry  VIII: normal hearing to speech  MOTOR: 5/5 in both upper, LE L stronger than R. L hip 4+/5, Knee 4+/5, Plantar dorsi, Eversion, Flex, inversion 4+/5, RLE Hip 4-, Knee 4-, Plantar dorsi, Eversion, Flex, inversion 4-.   REFLEXES: 2/4 throughout, bilateral flexor plantars  SENSORY: Normal to touch, temperature & pin prick in all extremities except lateral aspect of R leg  to LT and temp, Vibration decreased slightly b/l below the ankles, proprioception intact.   COORD: Normal finger to nose however slow with hesitation. Possible slight dysmetria.  Gait: unwilling at this time.   L SLR +.     LABS:                        12.5   6.70  )-----------( 218      ( 2023 05:30 )             37.9     04-21    134<L>  |  104  |  21  ----------------------------<  94  4.5   |  20<L>  |  0.93    Ca    8.5      2023 05:30  Phos  4.2     04-21  Mg     2.1     04-21    TPro  5.2<L>  /  Alb  2.5<L>  /  TBili  0.9  /  DBili  x   /  AST  29  /  ALT  22  /  AlkPhos  69  04-21    Hemoglobin A1C:   Vitamin B12   CAPILLARY BLOOD GLUCOSE  Urinalysis Basic - ( 2023 00:42 )    Color: Yellow / Appearance: SL Cloudy / S.025 / pH: x  Gluc: x / Ketone: Trace mg/dL  / Bili: Small / Urobili: 1.0 E.U./dL   Blood: x / Protein: 100 mg/dL / Nitrite: POSITIVE   Leuk Esterase: Small / RBC: 5-10 /HPF / WBC 5-10 /HPF   Sq Epi: x / Non Sq Epi: x / Bacteria: Present /HPF  Microbiology:  Urinalysis with Rflx Culture (collected 2023 00:42)  Culture - Blood (collected 2023 00:13)  Source: .Blood Blood  Preliminary Report (2023 13:00):    No growth at 12 hours    Culture - Blood (collected 2023 00:13)  Source: .Blood Blood  Preliminary Report (2023 13:00):    No growth at 12 hours      RADIOLOGY, EKG AND ADDITIONAL TESTS: Reviewed.  < from: CT Head No Cont (23 @ 11:41) >  IMPRESSION: No intracranial hemorrhage or acute transcortical infarct.    ---End of Report ---    < end of copied text >

## 2023-04-28 NOTE — DISCHARGE NOTE NURSING/CASE MANAGEMENT/SOCIAL WORK - NSDCPEFALRISK_GEN_ALL_CORE
For information on Fall & Injury Prevention, visit: [email or web address] OR  [email or web address] OR  https://www.cdc.gov/steadi/patient.html

## 2023-04-28 NOTE — DISCHARGE NOTE NURSING/CASE MANAGEMENT/SOCIAL WORK - PATIENT PORTAL LINK FT
You can access the FollowMyHealth Patient Portal offered by [facility] by registering at the following website: [email or web address]. By joining [email or web address]’s FollowMyHealth portal, you will also be able to view your health information using other applications (apps) compatible with our system.

## 2023-05-01 RX ORDER — VANCOMYCIN HCL 500 MG
1000 VIAL (EA) INTRAVENOUS
Qty: 1 | Refills: 0
Start: 2023-05-01 | End: 2023-05-28

## 2023-05-04 DIAGNOSIS — M51.16 INTERVERTEBRAL DISC DISORDERS WITH RADICULOPATHY, LUMBAR REGION: ICD-10-CM

## 2023-05-04 DIAGNOSIS — F10.10 ALCOHOL ABUSE, UNCOMPLICATED: ICD-10-CM

## 2023-05-04 DIAGNOSIS — B37.2 CANDIDIASIS OF SKIN AND NAIL: ICD-10-CM

## 2023-05-04 DIAGNOSIS — Z41.8 ENCOUNTER FOR OTHER PROCEDURES FOR PURPOSES OTHER THAN REMEDYING HEALTH STATE: ICD-10-CM

## 2023-05-04 DIAGNOSIS — E80.7 DISORDER OF BILIRUBIN METABOLISM, UNSPECIFIED: ICD-10-CM

## 2023-05-04 DIAGNOSIS — Z20.822 CONTACT WITH AND (SUSPECTED) EXPOSURE TO COVID-19: ICD-10-CM

## 2023-05-04 DIAGNOSIS — D53.9 NUTRITIONAL ANEMIA, UNSPECIFIED: ICD-10-CM

## 2023-05-04 DIAGNOSIS — N17.9 ACUTE KIDNEY FAILURE, UNSPECIFIED: ICD-10-CM

## 2023-05-04 DIAGNOSIS — Z87.891 PERSONAL HISTORY OF NICOTINE DEPENDENCE: ICD-10-CM

## 2023-05-04 DIAGNOSIS — D75.89 OTHER SPECIFIED DISEASES OF BLOOD AND BLOOD-FORMING ORGANS: ICD-10-CM

## 2023-05-04 DIAGNOSIS — E86.0 DEHYDRATION: ICD-10-CM

## 2023-05-04 DIAGNOSIS — Z79.82 LONG TERM (CURRENT) USE OF ASPIRIN: ICD-10-CM

## 2023-05-04 DIAGNOSIS — M51.36 OTHER INTERVERTEBRAL DISC DEGENERATION, LUMBAR REGION: ICD-10-CM

## 2023-05-04 DIAGNOSIS — E53.8 DEFICIENCY OF OTHER SPECIFIED B GROUP VITAMINS: ICD-10-CM

## 2023-05-04 DIAGNOSIS — N30.00 ACUTE CYSTITIS WITHOUT HEMATURIA: ICD-10-CM

## 2023-05-04 DIAGNOSIS — E11.51 TYPE 2 DIABETES MELLITUS WITH DIABETIC PERIPHERAL ANGIOPATHY WITHOUT GANGRENE: ICD-10-CM

## 2023-05-04 DIAGNOSIS — E87.1 HYPO-OSMOLALITY AND HYPONATREMIA: ICD-10-CM

## 2023-05-04 DIAGNOSIS — G62.1 ALCOHOLIC POLYNEUROPATHY: ICD-10-CM

## 2023-05-04 DIAGNOSIS — E44.1 MILD PROTEIN-CALORIE MALNUTRITION: ICD-10-CM

## 2023-05-04 DIAGNOSIS — K76.0 FATTY (CHANGE OF) LIVER, NOT ELSEWHERE CLASSIFIED: ICD-10-CM

## 2023-05-04 DIAGNOSIS — B96.20 UNSPECIFIED ESCHERICHIA COLI [E. COLI] AS THE CAUSE OF DISEASES CLASSIFIED ELSEWHERE: ICD-10-CM

## 2023-05-04 DIAGNOSIS — I10 ESSENTIAL (PRIMARY) HYPERTENSION: ICD-10-CM

## 2023-05-04 DIAGNOSIS — R62.7 ADULT FAILURE TO THRIVE: ICD-10-CM

## 2023-05-08 ENCOUNTER — APPOINTMENT (OUTPATIENT)
Age: 63
End: 2023-05-08

## 2024-03-25 ENCOUNTER — NON-APPOINTMENT (OUTPATIENT)
Age: 64
End: 2024-03-25